# Patient Record
Sex: MALE | Race: WHITE | NOT HISPANIC OR LATINO | Employment: OTHER | URBAN - METROPOLITAN AREA
[De-identification: names, ages, dates, MRNs, and addresses within clinical notes are randomized per-mention and may not be internally consistent; named-entity substitution may affect disease eponyms.]

---

## 2017-03-13 DIAGNOSIS — N40.0 BENIGN NON-NODULAR PROSTATIC HYPERPLASIA WITHOUT LOWER URINARY TRACT SYMPTOMS: ICD-10-CM

## 2017-03-13 RX ORDER — FINASTERIDE 5 MG/1
TABLET, FILM COATED ORAL
Qty: 30 TABLET | Refills: 5 | Status: SHIPPED | OUTPATIENT
Start: 2017-03-13 | End: 2017-05-15 | Stop reason: SDUPTHER

## 2017-03-22 ENCOUNTER — TELEPHONE (OUTPATIENT)
Dept: NEUROLOGY | Facility: CLINIC | Age: 67
End: 2017-03-22

## 2017-05-15 DIAGNOSIS — N40.0 BENIGN NON-NODULAR PROSTATIC HYPERPLASIA WITHOUT LOWER URINARY TRACT SYMPTOMS: ICD-10-CM

## 2017-05-15 RX ORDER — FINASTERIDE 5 MG/1
5 TABLET, FILM COATED ORAL DAILY
Qty: 90 TABLET | Refills: 0 | Status: SHIPPED | OUTPATIENT
Start: 2017-05-15 | End: 2017-11-17 | Stop reason: SDUPTHER

## 2017-05-15 RX ORDER — FINASTERIDE 5 MG/1
TABLET, FILM COATED ORAL
Qty: 30 TABLET | Refills: 4 | OUTPATIENT
Start: 2017-05-15

## 2017-05-17 ENCOUNTER — OFFICE VISIT (OUTPATIENT)
Dept: INTERNAL MEDICINE | Age: 67
End: 2017-05-17

## 2017-05-17 VITALS
BODY MASS INDEX: 22.52 KG/M2 | WEIGHT: 148.6 LBS | HEIGHT: 68 IN | DIASTOLIC BLOOD PRESSURE: 70 MMHG | HEART RATE: 64 BPM | OXYGEN SATURATION: 99 % | TEMPERATURE: 97.7 F | SYSTOLIC BLOOD PRESSURE: 128 MMHG

## 2017-05-17 DIAGNOSIS — Z23 ENCOUNTER FOR IMMUNIZATION: ICD-10-CM

## 2017-05-17 DIAGNOSIS — Z00.00 MEDICARE ANNUAL WELLNESS VISIT, INITIAL: Primary | ICD-10-CM

## 2017-05-17 DIAGNOSIS — R73.01 IMPAIRED FASTING GLUCOSE: ICD-10-CM

## 2017-05-17 DIAGNOSIS — E78.5 HYPERLIPIDEMIA, UNSPECIFIED HYPERLIPIDEMIA TYPE: ICD-10-CM

## 2017-05-17 LAB
ALBUMIN SERPL-MCNC: 4.3 G/DL (ref 3.5–5.2)
ALBUMIN/GLOB SERPL: 1.7 G/DL
ALP SERPL-CCNC: 63 U/L (ref 39–117)
ALT SERPL-CCNC: <5 U/L (ref 1–41)
APPEARANCE UR: CLEAR
AST SERPL-CCNC: 27 U/L (ref 1–40)
BASOPHILS # BLD AUTO: 0.04 10*3/MM3 (ref 0–0.2)
BASOPHILS NFR BLD AUTO: 1.2 % (ref 0–1.5)
BILIRUB SERPL-MCNC: 0.5 MG/DL (ref 0.1–1.2)
BILIRUB UR QL STRIP: NEGATIVE
BUN SERPL-MCNC: 17 MG/DL (ref 8–23)
BUN/CREAT SERPL: 14.5 (ref 7–25)
CALCIUM SERPL-MCNC: 9.4 MG/DL (ref 8.6–10.5)
CHLORIDE SERPL-SCNC: 100 MMOL/L (ref 98–107)
CHOLEST SERPL-MCNC: 192 MG/DL (ref 0–200)
CO2 SERPL-SCNC: 26.7 MMOL/L (ref 22–29)
COLOR UR: YELLOW
CREAT SERPL-MCNC: 1.17 MG/DL (ref 0.76–1.27)
DEVELOPER EXPIRATION DATE: NORMAL
DEVELOPER LOT NUMBER: NORMAL
EOSINOPHIL # BLD AUTO: 0.06 10*3/MM3 (ref 0–0.7)
EOSINOPHIL NFR BLD AUTO: 1.8 % (ref 0.3–6.2)
ERYTHROCYTE [DISTWIDTH] IN BLOOD BY AUTOMATED COUNT: 12.2 % (ref 11.5–14.5)
EXPIRATION DATE: NORMAL
FECAL OCCULT BLOOD SCREEN, POC: NEGATIVE
GLOBULIN SER CALC-MCNC: 2.5 GM/DL
GLUCOSE SERPL-MCNC: 100 MG/DL (ref 65–99)
GLUCOSE UR QL: NEGATIVE
HCT VFR BLD AUTO: 38.4 % (ref 40.4–52.2)
HDLC SERPL-MCNC: 71 MG/DL (ref 40–60)
HGB BLD-MCNC: 13.4 G/DL (ref 13.7–17.6)
HGB UR QL STRIP: NEGATIVE
IMM GRANULOCYTES # BLD: 0 10*3/MM3 (ref 0–0.03)
IMM GRANULOCYTES NFR BLD: 0 % (ref 0–0.5)
KETONES UR QL STRIP: NEGATIVE
LDLC SERPL CALC-MCNC: 113 MG/DL (ref 0–100)
LEUKOCYTE ESTERASE UR QL STRIP: NEGATIVE
LYMPHOCYTES # BLD AUTO: 0.97 10*3/MM3 (ref 0.9–4.8)
LYMPHOCYTES NFR BLD AUTO: 29.8 % (ref 19.6–45.3)
Lab: NORMAL
MCH RBC QN AUTO: 33.4 PG (ref 27–32.7)
MCHC RBC AUTO-ENTMCNC: 34.9 G/DL (ref 32.6–36.4)
MCV RBC AUTO: 95.8 FL (ref 79.8–96.2)
MONOCYTES # BLD AUTO: 0.42 10*3/MM3 (ref 0.2–1.2)
MONOCYTES NFR BLD AUTO: 12.9 % (ref 5–12)
NEGATIVE CONTROL: NEGATIVE
NEUTROPHILS # BLD AUTO: 1.76 10*3/MM3 (ref 1.9–8.1)
NEUTROPHILS NFR BLD AUTO: 54.3 % (ref 42.7–76)
NITRITE UR QL STRIP: NEGATIVE
PH UR STRIP: 7 [PH] (ref 5–8)
PLATELET # BLD AUTO: 292 10*3/MM3 (ref 140–500)
POSITIVE CONTROL: POSITIVE
POTASSIUM SERPL-SCNC: 4.2 MMOL/L (ref 3.5–5.2)
PROT SERPL-MCNC: 6.8 G/DL (ref 6–8.5)
PROT UR QL STRIP: NEGATIVE
PSA SERPL-MCNC: 0.3 NG/ML (ref 0–4)
RBC # BLD AUTO: 4.01 10*6/MM3 (ref 4.6–6)
SODIUM SERPL-SCNC: 139 MMOL/L (ref 136–145)
SP GR UR: 1.01 (ref 1–1.03)
TRIGL SERPL-MCNC: 41 MG/DL (ref 0–150)
UROBILINOGEN UR STRIP-MCNC: NORMAL MG/DL
VLDLC SERPL CALC-MCNC: 8.2 MG/DL (ref 5–40)
WBC # BLD AUTO: 3.25 10*3/MM3 (ref 4.5–10.7)

## 2017-05-17 PROCEDURE — 90670 PCV13 VACCINE IM: CPT | Performed by: INTERNAL MEDICINE

## 2017-05-17 PROCEDURE — 90715 TDAP VACCINE 7 YRS/> IM: CPT | Performed by: INTERNAL MEDICINE

## 2017-05-17 PROCEDURE — 99213 OFFICE O/P EST LOW 20 MIN: CPT | Performed by: INTERNAL MEDICINE

## 2017-05-17 PROCEDURE — 90471 IMMUNIZATION ADMIN: CPT | Performed by: INTERNAL MEDICINE

## 2017-05-17 PROCEDURE — G0328 FECAL BLOOD SCRN IMMUNOASSAY: HCPCS | Performed by: INTERNAL MEDICINE

## 2017-05-17 PROCEDURE — G0438 PPPS, INITIAL VISIT: HCPCS | Performed by: INTERNAL MEDICINE

## 2017-05-17 PROCEDURE — G0009 ADMIN PNEUMOCOCCAL VACCINE: HCPCS | Performed by: INTERNAL MEDICINE

## 2017-05-24 ENCOUNTER — OFFICE VISIT (OUTPATIENT)
Dept: NEUROLOGY | Facility: CLINIC | Age: 67
End: 2017-05-24

## 2017-05-24 VITALS — BODY MASS INDEX: 22.67 KG/M2 | OXYGEN SATURATION: 100 % | HEIGHT: 68 IN | WEIGHT: 149.6 LBS | HEART RATE: 72 BPM

## 2017-05-24 DIAGNOSIS — G20 PARKINSON'S DISEASE (HCC): Primary | ICD-10-CM

## 2017-05-24 DIAGNOSIS — Q04.9 CORTICAL DYSPLASIA (HCC): ICD-10-CM

## 2017-05-24 DIAGNOSIS — G25.81 RESTLESS LEGS SYNDROME: ICD-10-CM

## 2017-05-24 PROCEDURE — 99213 OFFICE O/P EST LOW 20 MIN: CPT | Performed by: PSYCHIATRY & NEUROLOGY

## 2017-05-24 RX ORDER — CARBIDOPA, LEVODOPA AND ENTACAPONE 37.5; 200; 15 MG/1; MG/1; MG/1
1 TABLET, FILM COATED ORAL 3 TIMES DAILY
Qty: 270 TABLET | Refills: 3 | Status: SHIPPED | OUTPATIENT
Start: 2017-05-24 | End: 2018-05-31 | Stop reason: SDUPTHER

## 2017-05-24 RX ORDER — RASAGILINE 1 MG/1
1 TABLET ORAL DAILY
Qty: 90 TABLET | Refills: 3 | Status: SHIPPED | OUTPATIENT
Start: 2017-05-24 | End: 2017-08-21 | Stop reason: SDUPTHER

## 2017-07-11 ENCOUNTER — TELEPHONE (OUTPATIENT)
Dept: NEUROLOGY | Facility: CLINIC | Age: 67
End: 2017-07-11

## 2017-07-13 ENCOUNTER — TELEPHONE (OUTPATIENT)
Dept: NEUROLOGY | Facility: CLINIC | Age: 67
End: 2017-07-13

## 2017-07-13 NOTE — TELEPHONE ENCOUNTER
I returned a call.  There was no answer.  I tried each day this week.  Today I left a phone message as follows.    His message to me was that he was doing well after we increased the azilect and levo.  My message back to gym is that if he would like for me to reduce the Neupro patch to 2 mg to taper off of me know.  He had decreased concentration and hallucinations at various times on these medicines.

## 2017-08-07 RX ORDER — ROTIGOTINE 4 MG/24H
PATCH, EXTENDED RELEASE TRANSDERMAL
Qty: 30 PATCH | Refills: 0 | Status: SHIPPED | OUTPATIENT
Start: 2017-08-07 | End: 2017-09-17 | Stop reason: SDUPTHER

## 2017-08-11 ENCOUNTER — OFFICE VISIT (OUTPATIENT)
Dept: INTERNAL MEDICINE | Age: 67
End: 2017-08-11

## 2017-08-11 VITALS
HEART RATE: 94 BPM | TEMPERATURE: 97.6 F | HEIGHT: 68 IN | SYSTOLIC BLOOD PRESSURE: 112 MMHG | WEIGHT: 151.6 LBS | DIASTOLIC BLOOD PRESSURE: 66 MMHG | OXYGEN SATURATION: 99 % | BODY MASS INDEX: 22.97 KG/M2

## 2017-08-11 DIAGNOSIS — L24.7 PLANT IRRITANT CONTACT DERMATITIS: Primary | ICD-10-CM

## 2017-08-11 PROCEDURE — 99213 OFFICE O/P EST LOW 20 MIN: CPT | Performed by: NURSE PRACTITIONER

## 2017-08-11 RX ORDER — PREDNISONE 10 MG/1
10 TABLET ORAL DAILY
Qty: 30 TABLET | Refills: 0 | Status: SHIPPED | OUTPATIENT
Start: 2017-08-11 | End: 2017-10-04

## 2017-08-11 NOTE — PATIENT INSTRUCTIONS
Poison Ivy Dermatitis  Poison ivy dermatitis is inflammation of the skin that is caused by the allergens on the leaves of the poison ivy plant. The skin reaction often involves redness, swelling, blisters, and extreme itching.  CAUSES  This condition is caused by a specific chemical (urushiol) found in the sap of the poison ivy plant. This chemical is sticky and can be easily spread to people, animals, and objects. You can get poison ivy dermatitis by:  · Having direct contact with a poison ivy plant.  · Touching animals, other people, or objects that have come in contact with poison ivy and have the chemical on them.  RISK FACTORS  This condition is more likely to develop in:  · People who are outdoors often.  · People who go outdoors without wearing protective clothing, such as closed shoes, long pants, and a long-sleeved shirt.  SYMPTOMS  Symptoms of this condition include:  · Redness and itching.  · A rash that often includes bumps and blisters. The rash usually appears 48 hours after exposure.  · Swelling. This may occur if the reaction is more severe.  Symptoms usually last for 1-2 weeks. However, the first time you develop this condition, symptoms may last 3-4 weeks.  DIAGNOSIS  This condition may be diagnosed based on your symptoms and a physical exam. Your health care provider may also ask you about any recent outdoor activity.  TREATMENT  Treatment for this condition will vary depending on how severe it is. Treatment may include:  · Hydrocortisone creams or calamine lotions to relieve itching.  · Oatmeal baths to soothe the skin.  · Over-the-counter antihistamine tablets.  · Oral steroid medicine for more severe outbreaks.  HOME CARE INSTRUCTIONS  · Take or apply over-the-counter and prescription medicines only as told by your health care provider.  · Wash exposed skin as soon as possible with soap and cold water.  · Use hydrocortisone creams or calamine lotion as needed to soothe the skin and relieve  itching.  · Take oatmeal baths as needed. Use colloidal oatmeal. You can get this at your local pharmacy or grocery store. Follow the instructions on the packaging.  · Do not scratch or rub your skin.  · While you have the rash, wash clothes right after you wear them.  PREVENTION  · Learn to identify the poison ivy plant and avoid contact with the plant. This plant can be recognized by the number of leaves. Generally, poison ivy has three leaves with flowering branches on a single stem. The leaves are typically glossy, and they have jagged edges that come to a point at the front.  · If you have been exposed to poison ivy, thoroughly wash with soap and water right away. You have about 30 minutes to remove the plant resin before it will cause the rash. Be sure to wash under your fingernails because any plant resin there will continue to spread the rash.  · When hiking or camping, wear clothes that will help you to avoid exposure on the skin. This includes long pants, a long-sleeved shirt, tall socks, and hiking boots. You can also apply preventive lotion to your skin to help limit exposure.  · If you suspect that your clothes or outdoor gear came in contact with poison ivy, rinse them off outside with a garden hose before you bring them inside your house.  SEEK MEDICAL CARE IF:  · You have open sores in the rash area.  · You have more redness, swelling, or pain in the affected area.  · You have redness that spreads beyond the rash area.  · You have fluid, blood, or pus coming from the affected area.  · You have a fever.  · You have a rash over a large area of your body.  · You have a rash on your eyes, mouth, or genitals.  · Your rash does not improve after a few days.  SEEK IMMEDIATE MEDICAL CARE IF:  · Your face swells or your eyes swell shut.    · You have trouble breathing.    · You have trouble swallowing.       This information is not intended to replace advice given to you by your health care provider. Make  sure you discuss any questions you have with your health care provider.     Document Released: 12/15/2001 Document Revised: 04/10/2017 Document Reviewed: 05/25/2016  [employer] Interactive Patient Education ©2017 [employer]

## 2017-08-11 NOTE — PROGRESS NOTES
Subjective   Rei Silverman is a 66 y.o. male.     Rash   This is a new problem. The current episode started yesterday. The problem has been rapidly worsening since onset. The affected locations include the left arm, right arm, face, torso, back and abdomen. The rash is characterized by blistering and itchiness. He was exposed to plant contact. Pertinent negatives include no fever or joint pain. Past treatments include topical steroids. The treatment provided moderate relief. There is no history of allergies, asthma or eczema.        The following portions of the patient's history were reviewed and updated as appropriate: allergies, current medications, past family history, past medical history, past social history, past surgical history and problem list.    Review of Systems   Constitutional: Negative for chills and fever.   Musculoskeletal: Negative for joint pain.   Skin: Positive for rash.       Objective   Physical Exam   Constitutional: He is oriented to person, place, and time. Vital signs are normal. He appears well-developed and well-nourished. He is cooperative. He does not appear ill. No distress.   Neurological: He is alert and oriented to person, place, and time.   Skin: Skin is warm, dry and intact. Rash noted. Rash is vesicular.        Mild swelling to left upper eyelid.   Psychiatric: He has a normal mood and affect. His speech is normal and behavior is normal. Judgment and thought content normal. Cognition and memory are normal.   Nursing note and vitals reviewed.      Assessment/Plan   Problems Addressed this Visit     None      Visit Diagnoses     Plant irritant contact dermatitis    -  Primary    Relevant Medications    predniSONE (DELTASONE) 10 MG tablet        1. Plant irritant contact dermatitis    - predniSONE (DELTASONE) 10 MG tablet; Take 1 tablet by mouth Daily. 5 tabs PO daily x 2 days, 4 PO daily x 2, 3 PO daily x 2 days, 2 PO daily x 2 days, 1 tab PO daily x 2 days.  Dispense: 30  tablet; Refill: 0

## 2017-08-21 RX ORDER — RASAGILINE 1 MG/1
1 TABLET ORAL DAILY
Qty: 90 TABLET | Refills: 3 | Status: SHIPPED | OUTPATIENT
Start: 2017-08-21 | End: 2018-08-18 | Stop reason: SDUPTHER

## 2017-09-11 RX ORDER — TAMSULOSIN HYDROCHLORIDE 0.4 MG/1
CAPSULE ORAL
Qty: 30 CAPSULE | Refills: 1 | Status: SHIPPED | OUTPATIENT
Start: 2017-09-11 | End: 2017-11-09 | Stop reason: SDUPTHER

## 2017-09-18 RX ORDER — ROTIGOTINE 4 MG/24H
PATCH, EXTENDED RELEASE TRANSDERMAL
Qty: 30 PATCH | Refills: 0 | Status: SHIPPED | OUTPATIENT
Start: 2017-09-18 | End: 2017-10-16 | Stop reason: SDUPTHER

## 2017-10-04 ENCOUNTER — OFFICE VISIT (OUTPATIENT)
Dept: NEUROLOGY | Facility: CLINIC | Age: 67
End: 2017-10-04

## 2017-10-04 VITALS
BODY MASS INDEX: 22.64 KG/M2 | OXYGEN SATURATION: 94 % | DIASTOLIC BLOOD PRESSURE: 70 MMHG | WEIGHT: 149.4 LBS | SYSTOLIC BLOOD PRESSURE: 122 MMHG | HEART RATE: 64 BPM | HEIGHT: 68 IN

## 2017-10-04 DIAGNOSIS — Q04.9 CORTICAL DYSPLASIA (HCC): ICD-10-CM

## 2017-10-04 DIAGNOSIS — G20 PARKINSON'S DISEASE (HCC): ICD-10-CM

## 2017-10-04 DIAGNOSIS — G25.81 RESTLESS LEGS SYNDROME: Primary | ICD-10-CM

## 2017-10-04 PROCEDURE — 99214 OFFICE O/P EST MOD 30 MIN: CPT | Performed by: PSYCHIATRY & NEUROLOGY

## 2017-10-04 NOTE — PROGRESS NOTES
Subjective:     Patient ID: Rei Silverman is a 66 y.o. male.    History of Present Illness  The following portions of the patient's history were reviewed and updated as appropriate: allergies, current medications, past family history, past medical history, past social history, past surgical history and problem list.  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 He has had no falls--except one at [employer] >6mos ago, tripped on stairs.. I reviewed the 10 Parkinson's disease measures.  Does not feel anxious. Sleeping well. Some drooling, but minimal (better w gum) . Soft voice. Micrographia is improving.   Golfs and swims skiing, biking.      There is no autonomic dysfunction, sleep disturbance, falls.    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 2 scans in 2013. Last scan in October 2014. No headaches and does not want rescan.    STM-abnormal     LAST OV- resumed higher azilect 1.0. Neupro still at 4mg. Stalevo 150 tid.  THIS OV- great! No wearing off.   Review of Systems   Constitutional: Positive for fatigue. Negative for activity change and appetite change.   HENT: Negative for ear pain, facial swelling and trouble swallowing.    Eyes: Positive for visual disturbance (due to medication has some double vision). Negative for photophobia and pain.   Respiratory: Negative for choking, chest tightness and shortness of breath.    Cardiovascular: Negative for chest pain, palpitations and leg swelling.   Gastrointestinal: Negative for abdominal pain, constipation and nausea.   Endocrine: Positive for polydipsia. Negative for polyphagia and polyuria.   Genitourinary: Negative for difficulty urinating, frequency and urgency.   Musculoskeletal: Negative for back pain, gait problem and neck pain.   Skin: Negative for color change, rash and wound.   Allergic/Immunologic: Negative for  environmental allergies, food allergies and immunocompromised state.   Neurological: Negative for dizziness, tremors, seizures, syncope, facial asymmetry, speech difficulty, weakness, light-headedness, numbness and headaches.   Hematological: Negative for adenopathy. Does not bruise/bleed easily.   Psychiatric/Behavioral: Positive for confusion. Negative for agitation, behavioral problems, decreased concentration, dysphoric mood, hallucinations, self-injury, sleep disturbance and suicidal ideas. The patient is not nervous/anxious and is not hyperactive.         Objective:    Neurologic Exam     Mental Status   Oriented to person, place, and time.   Registration: recalls 3 of 3 objects. Follows 2 step commands.   Attention: normal. Concentration: normal.   Speech: speech is normal   Level of consciousness: alert  Knowledge: good.        MOCA-27   Slow on trails and clock, but got them correct.  3/5 on stm     Cranial Nerves   Cranial nerves II through XII intact.        Mild facial bradykinesis     Motor Exam   Muscle bulk: normal  Overall muscle tone: normal  Right arm tone: cogwheel rigidity  Left arm tone: normal  Right arm pronator drift: absent  Left arm pronator drift: absent  Right leg tone: normal  Left leg tone: normal       Grade 5 in all      Sensory Exam   Light touch normal.   Vibration normal.     Gait, Coordination, and Reflexes     Gait  Gait: normal    Coordination   Romberg: negative  Finger to nose coordination: normal    Tremor   Resting tremor: present  Intention tremor: absent  Action tremor: absent       Grade 1 dtrs       Physical Exam   Constitutional: He is oriented to person, place, and time. He appears well-developed and well-nourished.   Neck: Normal range of motion. Neck supple.   Cardiovascular: Normal rate.    Neurological: He is oriented to person, place, and time. He has a normal Finger-Nose-Finger Test and a normal Romberg Test. Gait normal.   Psychiatric: He has a normal mood and  affect. His speech is normal.   Nursing note and vitals reviewed.      Assessment/Plan:       Problems Addressed this Visit        Unprioritized    Cortical dysplasia    Parkinson's disease    Restless legs syndrome - Primary           He is stable. MOCA is normal (27)  No new AE. Will continue same meds..as he is much better on stalevo tid

## 2017-10-16 RX ORDER — ROTIGOTINE 4 MG/24H
PATCH, EXTENDED RELEASE TRANSDERMAL
Qty: 30 PATCH | Refills: 0 | Status: SHIPPED | OUTPATIENT
Start: 2017-10-16 | End: 2017-11-17 | Stop reason: SDUPTHER

## 2017-11-09 DIAGNOSIS — N40.0 BENIGN PROSTATIC HYPERPLASIA WITHOUT LOWER URINARY TRACT SYMPTOMS: Primary | ICD-10-CM

## 2017-11-09 RX ORDER — TAMSULOSIN HYDROCHLORIDE 0.4 MG/1
CAPSULE ORAL
Qty: 30 CAPSULE | Refills: 5 | Status: SHIPPED | OUTPATIENT
Start: 2017-11-09 | End: 2018-03-08 | Stop reason: SDUPTHER

## 2017-11-17 DIAGNOSIS — N40.0 BENIGN NON-NODULAR PROSTATIC HYPERPLASIA WITHOUT LOWER URINARY TRACT SYMPTOMS: ICD-10-CM

## 2017-11-17 RX ORDER — ROTIGOTINE 4 MG/24H
PATCH, EXTENDED RELEASE TRANSDERMAL
Qty: 30 PATCH | Refills: 0 | Status: SHIPPED | OUTPATIENT
Start: 2017-11-17 | End: 2018-11-02 | Stop reason: SDUPTHER

## 2017-11-17 RX ORDER — FINASTERIDE 5 MG/1
TABLET, FILM COATED ORAL
Qty: 30 TABLET | Refills: 4 | Status: SHIPPED | OUTPATIENT
Start: 2017-11-17 | End: 2018-05-31 | Stop reason: SDUPTHER

## 2017-12-11 DIAGNOSIS — N40.0 BENIGN NON-NODULAR PROSTATIC HYPERPLASIA WITHOUT LOWER URINARY TRACT SYMPTOMS: ICD-10-CM

## 2017-12-11 RX ORDER — FINASTERIDE 5 MG/1
TABLET, FILM COATED ORAL
Qty: 90 TABLET | Refills: 0 | Status: SHIPPED | OUTPATIENT
Start: 2017-12-11 | End: 2018-04-04 | Stop reason: SDUPTHER

## 2018-03-07 RX ORDER — FINASTERIDE 5 MG/1
TABLET, FILM COATED ORAL
Qty: 90 TABLET | Refills: 0 | Status: SHIPPED | OUTPATIENT
Start: 2018-03-07 | End: 2018-04-04 | Stop reason: SDUPTHER

## 2018-03-08 DIAGNOSIS — N40.0 BENIGN PROSTATIC HYPERPLASIA WITHOUT LOWER URINARY TRACT SYMPTOMS: ICD-10-CM

## 2018-03-08 RX ORDER — TAMSULOSIN HYDROCHLORIDE 0.4 MG/1
CAPSULE ORAL
Qty: 90 CAPSULE | Refills: 3 | Status: SHIPPED | OUTPATIENT
Start: 2018-03-08 | End: 2019-03-12 | Stop reason: SDUPTHER

## 2018-04-04 ENCOUNTER — OFFICE VISIT (OUTPATIENT)
Dept: NEUROLOGY | Facility: CLINIC | Age: 68
End: 2018-04-04

## 2018-04-04 VITALS
DIASTOLIC BLOOD PRESSURE: 58 MMHG | BODY MASS INDEX: 22.97 KG/M2 | SYSTOLIC BLOOD PRESSURE: 110 MMHG | OXYGEN SATURATION: 99 % | WEIGHT: 151.6 LBS | HEIGHT: 68 IN | HEART RATE: 62 BPM

## 2018-04-04 DIAGNOSIS — G20 PARKINSON'S DISEASE (HCC): Primary | ICD-10-CM

## 2018-04-04 DIAGNOSIS — G25.81 RESTLESS LEGS SYNDROME: ICD-10-CM

## 2018-04-04 DIAGNOSIS — Q04.9 CORTICAL DYSPLASIA (HCC): ICD-10-CM

## 2018-04-04 PROCEDURE — 99214 OFFICE O/P EST MOD 30 MIN: CPT | Performed by: PSYCHIATRY & NEUROLOGY

## 2018-04-04 NOTE — PROGRESS NOTES
Subjective:     Patient ID: Rei Silverman is a 67 y.o. male.    History of Present Illness  The following portions of the patient's history were reviewed and updated as appropriate: allergies, current medications, past family history, past medical history, past social history, past surgical history and problem list.     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 He has had no falls.  Sleeping well but wife has noted he has occasional PLM s- occasional yells out (past hx of sleep walking) but mostly moving, small twitch, and does not sound like REM sleep disorder.  Other symptoms include micrographia but his activity is excellent.  He golfed swims and bikes.  Possible referral for physical therapy was discussed in agreed to help reduce risk of falling in the future.    Abnormal short-term memory associated with parkinsonism.  This is stable.    AE OF DRUGS=VISUAL  HALLUC- unchanged and not too bothersome. Can see a' 3rd person' while walking.    ABNORMAL MRI BRAIN- has focal cortical dysplasia.  He has had 2 scans the last in 2014 and does not want to be rescanned    Parkinson's medicines: Azilect 1.0+ Neupro 4.0+ Stalevo 150 3 times a day.  Denies wearing off.  Occasional fatigue  Review of Systems   Constitutional: Positive for fatigue. Negative for activity change and appetite change.   HENT: Negative for ear pain, facial swelling and trouble swallowing.    Eyes: Negative for photophobia, pain and visual disturbance.   Respiratory: Negative for choking, chest tightness and shortness of breath.    Cardiovascular: Negative for chest pain, palpitations and leg swelling.   Gastrointestinal: Negative for abdominal pain, constipation and nausea.   Endocrine: Negative for polydipsia, polyphagia and polyuria.   Genitourinary: Negative for difficulty urinating, frequency and urgency.   Musculoskeletal: Positive  for back pain (fell on blck ice and has some soreness). Negative for gait problem and neck pain.   Skin: Negative for color change, rash and wound.   Allergic/Immunologic: Negative for environmental allergies, food allergies and immunocompromised state.   Neurological: Negative for dizziness, tremors, seizures, syncope, facial asymmetry, speech difficulty, weakness, light-headedness, numbness and headaches.   Hematological: Negative for adenopathy. Does not bruise/bleed easily.   Psychiatric/Behavioral: Positive for confusion, hallucinations and sleep disturbance (body movement and jerking when sleeping however he dos not wake up). Negative for agitation, behavioral problems, decreased concentration, dysphoric mood, self-injury and suicidal ideas. The patient is not nervous/anxious and is not hyperactive.         Objective:    Neurologic Exam     Mental Status   Oriented to person, place, and time.   Follows 3 step commands.   Attention: normal.   Speech: speech is normal   Level of consciousness: alert  Knowledge: good.   Able to name object. Able to read. Able to repeat. Able to write.   Rare hypophonia not seen today.  Occasional decreased concentration but not seen today     Cranial Nerves     CN II   Visual fields full to confrontation.     CN III, IV, VI   Pupils are equal, round, and reactive to light.  Extraocular motions are normal.     CN VII   Facial expression full, symmetric.     CN VIII   Hearing: intact    CN IX, X   CN IX normal.     CN XI   CN XI normal.     CN XII   CN XII normal.   Mild facial bradykinesia.  She is gum to keep from drooling.     Motor Exam   Muscle bulk: normal  Overall muscle tone: increased  Right arm tone: cogwheel rigidity  Left arm tone: normal  Right arm pronator drift: absent  Left arm pronator drift: absent  Right leg tone: normal  Left leg tone: normalNo weakness proximal bilateral     Sensory Exam   Light touch normal.   Vibration normal.     Gait, Coordination, and  Reflexes     Gait  Gait: normal    Coordination   Romberg: negative    Tremor   Resting tremor: absent  Intention tremor: absent  Action tremor: absent    Reflexes   Right triceps: 1+  Left triceps: 1+  Right patellar: 1+  Left patellar: 1+Not lose his balance with the push pull test       Physical Exam   Constitutional: He is oriented to person, place, and time. He appears well-developed and well-nourished.   Eyes: EOM are normal. Pupils are equal, round, and reactive to light.   Neck: Normal range of motion. Neck supple.   Cardiovascular: Normal rate.    Pulmonary/Chest: Effort normal.   Neurological: He is oriented to person, place, and time. He has a normal Romberg Test. Gait normal.   Reflex Scores:       Tricep reflexes are 1+ on the right side and 1+ on the left side.       Patellar reflexes are 1+ on the right side and 1+ on the left side.  Psychiatric: He has a normal mood and affect. His speech is normal.   Nursing note and vitals reviewed.      Assessment/Plan:       Problems Addressed this Visit        Unprioritized    Cortical dysplasia    Parkinson's disease - Primary    Relevant Orders    Ambulatory Referral to Physical Therapy (parkinsons disease)    Restless legs syndrome      Other Visit Diagnoses    None.            We spent 30 minutes discussing other aspects of the disease treatment including cost of Azilect, use of Neupro versus hallucinations, possible use of RYTARY each her.  Experimental treatments available with albuterol and Byetta.  No changes made today.  I think he is an excellent responder to Azilect.  Follow-up 6 months.  Referred to physical therapy

## 2018-04-20 DIAGNOSIS — G20 PARKINSON'S DISEASE (HCC): Primary | ICD-10-CM

## 2018-04-23 ENCOUNTER — TELEPHONE (OUTPATIENT)
Dept: NEUROLOGY | Facility: CLINIC | Age: 68
End: 2018-04-23

## 2018-04-23 DIAGNOSIS — G20 PARKINSON'S DISEASE (HCC): Primary | ICD-10-CM

## 2018-05-01 ENCOUNTER — TELEPHONE (OUTPATIENT)
Dept: INTERNAL MEDICINE | Age: 68
End: 2018-05-01

## 2018-05-01 NOTE — TELEPHONE ENCOUNTER
Pt stated he just noticed last few days a spot on side of tongue, and each day it has grown a little more. He stated it looks like the size of small curd of cottage cheese, it's white and hard. Pt denied any pain.   It's obvious to him that it is there especially when he talks.  Pt wasn't sure if Dr Monae would want to see him or send him to see a specialist?  Pt's # [phone]  Thanks SP

## 2018-05-01 NOTE — TELEPHONE ENCOUNTER
I would start with the dentist, and if that is not helpful, we can send him to see an ENT physician.

## 2018-05-31 DIAGNOSIS — N40.0 BENIGN NON-NODULAR PROSTATIC HYPERPLASIA WITHOUT LOWER URINARY TRACT SYMPTOMS: ICD-10-CM

## 2018-05-31 RX ORDER — CARBIDOPA, LEVODOPA AND ENTACAPONE 37.5; 200; 15 MG/1; MG/1; MG/1
TABLET, FILM COATED ORAL
Qty: 270 TABLET | Refills: 2 | Status: SHIPPED | OUTPATIENT
Start: 2018-05-31 | End: 2018-11-02 | Stop reason: SDUPTHER

## 2018-06-01 RX ORDER — FINASTERIDE 5 MG/1
TABLET, FILM COATED ORAL
Qty: 90 TABLET | Refills: 0 | Status: SHIPPED | OUTPATIENT
Start: 2018-06-01 | End: 2018-08-18 | Stop reason: SDUPTHER

## 2018-06-27 ENCOUNTER — TREATMENT (OUTPATIENT)
Dept: NEUROLOGY | Facility: CLINIC | Age: 68
End: 2018-06-27

## 2018-06-27 NOTE — PROGRESS NOTES
My review of speech-language pathology note from [facility] from June 20, 2018 1 the cognitive linguistic Quick test demonstrated borderline, moderate to severe, impairment in memory, executive function, and visual spatial skills.  Patient's language was only mildly impaired.  He had slow processing of information.  He had disorganized scanning and visual information.  There was poor self-monitoring during cognitive tasks.  Significantly reduced working memory was noted.  Overall executive function deficits are severely impairing patient's safety and problem solving.  He also reports difficulty with speech and voice.  However the cognitive communication deficit is a priority and we'll be treated first.

## 2018-08-07 ENCOUNTER — TELEPHONE (OUTPATIENT)
Dept: INTERNAL MEDICINE | Age: 68
End: 2018-08-07

## 2018-08-07 NOTE — TELEPHONE ENCOUNTER
Pt called stating he was recently discharged from hospital with new medication, Lipitor 80 mg once daily.  Pt requesting a 30 day supply sent to his [facility] pharmacy.  Pt's # [phone]  Thanks SP

## 2018-08-07 NOTE — TELEPHONE ENCOUNTER
Please advise? I see nothing in patient's chart in regards to pt being seen at Hospital or being admitted to [facility] or [facility]. Pt has no Hospital F/U scheduled with Dr. Monae also. Please advise?

## 2018-08-07 NOTE — TELEPHONE ENCOUNTER
We need to find out where he went to ER, obtain records, give them to doc for review, then ask him if he wants to refill. MM

## 2018-08-08 DIAGNOSIS — G20 PARKINSON'S DISEASE (HCC): Primary | ICD-10-CM

## 2018-08-18 DIAGNOSIS — N40.0 BENIGN NON-NODULAR PROSTATIC HYPERPLASIA WITHOUT LOWER URINARY TRACT SYMPTOMS: ICD-10-CM

## 2018-08-20 RX ORDER — FINASTERIDE 5 MG/1
TABLET, FILM COATED ORAL
Qty: 90 TABLET | Refills: 0 | Status: SHIPPED | OUTPATIENT
Start: 2018-08-20 | End: 2018-12-01 | Stop reason: SDUPTHER

## 2018-08-20 RX ORDER — RASAGILINE 1 MG/1
TABLET ORAL
Qty: 90 TABLET | Refills: 2 | Status: SHIPPED | OUTPATIENT
Start: 2018-08-20 | End: 2018-11-02 | Stop reason: SDUPTHER

## 2018-09-05 ENCOUNTER — TELEPHONE (OUTPATIENT)
Dept: INTERNAL MEDICINE | Age: 68
End: 2018-09-05

## 2018-09-05 NOTE — TELEPHONE ENCOUNTER
Send him in Lipitor, 80 mg-number 30-3 refills.  Any further refills will need to come from Dr. Monae.

## 2018-09-05 NOTE — TELEPHONE ENCOUNTER
Patient was started on lipitor 80mg when he was in the hospital about a month ago. He has now run out and he wants to know if you want him to continue on them and if you do he needs the refill called into [facility] on [address].  Any questions please call [phone]

## 2018-09-06 RX ORDER — ATORVASTATIN CALCIUM 80 MG/1
80 TABLET, FILM COATED ORAL DAILY
Qty: 30 TABLET | Refills: 3 | Status: SHIPPED | OUTPATIENT
Start: 2018-09-06 | End: 2018-12-01 | Stop reason: SDUPTHER

## 2018-09-13 PROBLEM — Z86.010 HISTORY OF COLON POLYPS: Status: ACTIVE | Noted: 2018-06-27

## 2018-10-10 ENCOUNTER — OFFICE VISIT (OUTPATIENT)
Dept: NEUROLOGY | Facility: CLINIC | Age: 68
End: 2018-10-10

## 2018-10-10 VITALS
HEIGHT: 68 IN | BODY MASS INDEX: 22.25 KG/M2 | HEART RATE: 62 BPM | SYSTOLIC BLOOD PRESSURE: 98 MMHG | WEIGHT: 146.8 LBS | DIASTOLIC BLOOD PRESSURE: 62 MMHG | OXYGEN SATURATION: 99 %

## 2018-10-10 DIAGNOSIS — Q04.9 CORTICAL DYSPLASIA (HCC): Primary | ICD-10-CM

## 2018-10-10 DIAGNOSIS — G20 PARKINSON'S DISEASE (HCC): ICD-10-CM

## 2018-10-10 DIAGNOSIS — G45.9 TIA (TRANSIENT ISCHEMIC ATTACK): ICD-10-CM

## 2018-10-10 PROCEDURE — 99215 OFFICE O/P EST HI 40 MIN: CPT | Performed by: PSYCHIATRY & NEUROLOGY

## 2018-10-10 NOTE — PROGRESS NOTES
"Subjective:     Patient ID: Rei Silverman is a 67 y.o. male.    History of Present Illness  The following portions of the patient's history were reviewed and updated as appropriate: allergies, current medications, past family history, past medical history, past social history, past surgical history and problem list.     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 He has had no falls.  Sleeping well but wife has noted he has occasional PLM s- occasional yells out (past hx of sleep walking)     Other symptoms include micrographia but his activity is excellent.  He golfed swims and bikes.  He was referred to physical therapy to help reduce risk of falling in the future.            Azilect 1.0+ Neupro 4.0+ Stalevo 150 3 times a day.      STM-chronic abnormality from PD.           My review of speech-language pathology note from [facility] from June 20, 2018 1 the cognitive linguistic Quick test demonstrated borderline, moderate to severe, impairment in memory, executive function, and visual spatial skills.  Patient's language was only mildly impaired.  He had slow processing of information.  He had disorganized scanning and visual information.  There was poor self-monitoring during cognitive tasks.  Significantly reduced working memory was noted.  Overall executive function deficits are severely impairing patient's safety and problem solving.  He also reports difficulty with speech and voice.  However the cognitive communication deficit  treated first through speech therapy.  Heat aggravates fatigue.  He will be reeval by ST in 6 months.     TIA- he reported an abnormality of speech, with slurring, MRI history stated \"two transient episode of difficulty speaking and word finding difficulty\"         HISTORY- 20 min spell, spoke odd sounds, not words, then resolved. Repeated a second time. 20 min. No headache. No " HP. Was admitted for 2 days  at [place] [address] .  CTA of H/N ok. Stroke team said possible PD symptoms. Started Lipitor 80mg/d.  Echo was normal with EF of 55%, but no KONRAD, no bubble study, no L-T monitor or LINQ recorder, etc.       AE OF DRUGS=     VISUAL  HALLUC- unchanged and not too bothersome. Can see a' 3rd person' while walking. Occasional fatigue, probably from Nupro.      ABNORMAL MRI BRAIN- has focal cortical dysplasia.  He has had 2 scans the last in 2014.      Review of Systems   Constitutional: Positive for fatigue. Negative for activity change and appetite change.   HENT: Positive for trouble swallowing. Negative for ear pain and facial swelling.    Eyes: Positive for discharge (right eye crusty on occ.). Negative for photophobia, pain and visual disturbance.   Respiratory: Negative for choking, chest tightness and shortness of breath.    Cardiovascular: Negative for chest pain, palpitations and leg swelling.   Gastrointestinal: Negative for abdominal pain, constipation and nausea.   Endocrine: Negative for polydipsia, polyphagia and polyuria.   Genitourinary: Negative for difficulty urinating, frequency and urgency.   Musculoskeletal: Negative for back pain, gait problem and neck pain.   Skin: Negative for color change, rash and wound.   Allergic/Immunologic: Negative for environmental allergies, food allergies and immunocompromised state.   Neurological: Positive for speech difficulty. Negative for dizziness, tremors, seizures, syncope, facial asymmetry, weakness, light-headedness, numbness and headaches.   Hematological: Negative for adenopathy. Does not bruise/bleed easily.   Psychiatric/Behavioral: Positive for confusion, decreased concentration and hallucinations. Negative for agitation, behavioral problems, dysphoric mood, self-injury, sleep disturbance and suicidal ideas. The patient is not nervous/anxious and is not hyperactive.         Objective:    Neurologic Exam  Mental Status   Oriented to  person, place, and time.   Follows 3 step commands.   Attention: normal.   Speech: speech is normal   Level of consciousness: alert  Knowledge: good.   Able to name object. Able to read. Able to repeat. Able to write.    Occasional decreased concentration but not seen today      Cranial Nerves      CN II   Visual fields full to confrontation.      CN III, IV, VI   Pupils are equal, round, and reactive to light.  Extraocular motions are normal.      CN VII   Facial expression full, symmetric.      CN VIII   Hearing: intact     CN IX, X   CN IX normal.      CN XI   CN XI normal.      CN XII   CN XII normal.   Mild facial bradykinesia.       Motor Exam   Muscle bulk: normal  Overall muscle tone: increased  Right arm tone: cogwheel rigidity is mild  Left arm tone: normal  Right arm pronator drift: absent  Left arm pronator drift: absent  Right leg tone: normal  Left leg tone: normal    No weakness proximal bilateral      Sensory Exam   Light touch normal.   Vibration normal.      Gait: normal w some dyskinesia of arms, mile      Coordination   Romberg: negative     Tremor   Resting tremor: absent  Intention tremor: absent  Action tremor: absent     Reflexes   Right triceps: 1+  Left triceps: 1+  Right patellar: 1+  Left patellar: 1+    No LOB     Physical Exam   Constitutional: He appears well-developed and well-nourished.   Neck: Normal range of motion. Neck supple.   Cardiovascular: Normal rate.    Pulmonary/Chest: Effort normal.   Psychiatric: He has a normal mood and affect. His behavior is normal.   Nursing note and vitals reviewed.      Assessment/Plan:       Problems Addressed this Visit        Unprioritized    Cortical dysplasia (CMS/HCC) - Primary    Parkinson's disease (CMS/HCC)    TIA (transient ischemic attack)             Parkinsonism is stable.  Mild hallucinations not a problem and no change in medication warranted at this time.    The TIA history shows no stroke or vascular disease but he has not had  further cardiac evaluation to rule out atrial fibrillation or disease of the posterior heart wall or mitral valve and thus she will need cardiology referral.  He will contact Dr. Monae for the referral.    The brain MRI at [place] [address] showed no evidence of change in the cortical dysplasia found on prior brain MRI.      Follow-up scheduled here. I spent 40 minutes face to face time with this patient with greater than 50% of the time for counseling care with long discussion concerning TIA.CHAY VASC used as well as the need for cardiology evaluation.  Extra 25 minutes spent reviewing all the studies from [facility]

## 2018-10-10 NOTE — PROGRESS NOTES
Subjective:     Patient ID: Rei Silverman is a 67 y.o. male.    History of Present Illness  {Common ambulatory SmartLinks:57139}    Review of Systems     Objective:    Neurologic Exam    Physical Exam    Assessment/Plan:     {Assess/PlanSmartLinks:56821}

## 2018-10-12 ENCOUNTER — TELEPHONE (OUTPATIENT)
Dept: INTERNAL MEDICINE | Age: 68
End: 2018-10-12

## 2018-10-12 DIAGNOSIS — G45.9 TIA (TRANSIENT ISCHEMIC ATTACK): Primary | ICD-10-CM

## 2018-10-12 NOTE — TELEPHONE ENCOUNTER
Pt was seen by neurology who suggested a cardiology consultation because of his TIA.  Please arrange this with Dr. Claudio

## 2018-10-12 NOTE — TELEPHONE ENCOUNTER
P/Shanique(wife) pt [facility] & since then a lot has occurred. However, Dr. Edu Farrar/neurologist has discussed with the pt & wife to make contact with  in re: to setting up a cardiology referral ASAP due to a test to examine the back of the heart & heart chip monitor.    Pls advise.    Shanique can be reached #[phone].

## 2018-10-22 ENCOUNTER — OFFICE VISIT (OUTPATIENT)
Dept: CARDIOLOGY | Facility: CLINIC | Age: 68
End: 2018-10-22

## 2018-10-22 VITALS
DIASTOLIC BLOOD PRESSURE: 78 MMHG | WEIGHT: 152.5 LBS | HEART RATE: 70 BPM | HEIGHT: 68 IN | SYSTOLIC BLOOD PRESSURE: 108 MMHG | BODY MASS INDEX: 23.11 KG/M2

## 2018-10-22 DIAGNOSIS — I48.0 PAROXYSMAL ATRIAL FIBRILLATION (HCC): ICD-10-CM

## 2018-10-22 DIAGNOSIS — G45.9 TIA (TRANSIENT ISCHEMIC ATTACK): Primary | ICD-10-CM

## 2018-10-22 DIAGNOSIS — E78.2 MIXED HYPERLIPIDEMIA: ICD-10-CM

## 2018-10-22 PROCEDURE — 93000 ELECTROCARDIOGRAM COMPLETE: CPT | Performed by: INTERNAL MEDICINE

## 2018-10-22 PROCEDURE — 99204 OFFICE O/P NEW MOD 45 MIN: CPT | Performed by: INTERNAL MEDICINE

## 2018-10-22 NOTE — PROGRESS NOTES
Date of Office Visit: 10/22/18  Encounter Provider: Emil Claudio MD  Place of Service: [facility] CARDIOLOGY  Patient Name: Rei Silverman  :1950  Referral Provider:Miles Monae MD      No chief complaint on file.    History of Present Illness  Mr Silverman is a very pleasant 66 yo gentleman with no prior history of heart disease but does have history of probable TIA, Parkinson's and hyperlipidemia.  His Parkinson's is been fairly stable but in 2018 he presented to [facility] emergency room after he had problems with very slurred garbled speech lasted about 15-20 minutes resolved and recurred again he was admitted to [facility] where he was seen by the stroke team his workup there was relatively negative in that he had a normal echocardiogram.  CTs and MRIs were negative and they felt that it was not definitely a TIA he did have a 2-D echocardiogram with Doppler that was normal.  He then saw his neurologist was concerned that he had a TIA.  Of note he had no episodes prior to that he's had no episodes since then..  No associated paralysis or paresthesias with it he was on aspirin 81 mg at the time.  He denies any prior history of palpitations, near-syncope or syncope.  No chest pain or pressure no orthopnea or PND.  In addition he exercises regularly he power walks close to 4 miles a day.          Past Medical History:   Diagnosis Date   • Essential tremor    • Hyperlipidemia    • Parkinson's disease (CMS/[facility])    • Septic joint of left elbow (CMS/[facility])              Past Surgical History:   Procedure Laterality Date   • COLONOSCOPY     • ELBOW DEBRIDEMENT     • NASAL SINUS SURGERY     • RETINAL DETACHMENT SURGERY           Current Outpatient Prescriptions on File Prior to Visit   Medication Sig Dispense Refill   • Ascorbic Acid (VITAMIN C) 500 MG capsule Take 1 capsule by mouth daily.     • aspirin 81 MG tablet Take 1 tablet by mouth daily.     • atorvastatin  (LIPITOR) 80 MG tablet Take 1 tablet by mouth Daily. 30 tablet 3   • B Complex-Folic Acid (SUPER B COMPLEX MAXI) tablet Take 1 tablet by mouth daily.     • carbidopa-levodopa-entacapone (STALEVO) 37.5-150-200 MG per tablet TAKE ONE TABLET BY MOUTH THREE TIMES A  tablet 2   • Cholecalciferol (VITAMIN D-3) 1000 UNITS capsule Take 1 capsule by mouth daily.     • finasteride (PROSCAR) 5 MG tablet TAKE ONE TABLET BY MOUTH DAILY 90 tablet 0   • Magnesium 400 MG capsule Take 1 capsule by mouth daily.     • melatonin tablet Take 6 mg by mouth Daily.     • Multiple Vitamins-Minerals (CENTRUM SILVER) tablet Take 1 tablet by mouth daily.     • NEUPRO 4 MG/24HR patch APPLY/CHANGE ONE PATCH TO THE SKIN DAILY 30 patch 0   • rasagiline (AZILECT) 1 MG tablet TAKE ONE TABLET BY MOUTH DAILY 90 tablet 2   • tamsulosin (FLOMAX) 0.4 MG capsule 24 hr capsule TAKE ONE CAPSULE BY MOUTH DAILY 90 capsule 3   • Unable to find 1 each 1 (One) Time. Med Name: Protandum     • vitamin E 400 UNIT capsule Take 1 capsule by mouth daily.       No current facility-administered medications on file prior to visit.          Social History     Social History   • Marital status:      Spouse name: N/A   • Number of children: 2   • Years of education: N/A     Occupational History   • Retired      Social History Main Topics   • Smoking status: Never Smoker   • Smokeless tobacco: Never Used      Comment: CAFFEINE USE   • Alcohol use No      Comment: Former use   • Drug use: No      Comment: Former use   • Sexual activity: Defer     Other Topics Concern   • Not on file     Social History Narrative   • No narrative on file       Family History   Problem Relation Age of Onset   • Breast cancer Mother    • Hypertension Mother    • Coronary artery disease Father         MI   • Depression Father    • Diabetes Father    • Hypertension Father    • Depression Sister          Review of Systems   Constitution: Negative for decreased appetite, diaphoresis,  "fever, weakness, malaise/fatigue, weight gain and weight loss.   HENT: Negative for congestion, hearing loss, nosebleeds and tinnitus.    Eyes: Positive for vision loss in left eye and vision loss in right eye. Negative for blurred vision, double vision and visual disturbance.   Cardiovascular:        As noted in HPI   Respiratory:        As noted HPI   Endocrine: Negative for cold intolerance and heat intolerance.   Hematologic/Lymphatic: Negative for bleeding problem. Does not bruise/bleed easily.   Skin: Negative for color change, flushing, itching and rash.   Musculoskeletal: Negative for arthritis, back pain, joint pain, joint swelling, muscle weakness and myalgias.   Gastrointestinal: Negative for bloating, abdominal pain, constipation, diarrhea, dysphagia, heartburn, hematemesis, hematochezia, melena, nausea and vomiting.   Genitourinary: Negative for bladder incontinence, dysuria, frequency, nocturia and urgency.   Neurological: Negative for dizziness, focal weakness, headaches, light-headedness, loss of balance, numbness, paresthesias and vertigo.   Psychiatric/Behavioral: Negative for depression, memory loss and substance abuse.       Procedures      ECG 12 Lead  Date/Time: 10/22/2018 3:42 PM  Performed by: AKIRA CORONA  Authorized by: AKIRA CORONA   Comparison: compared with previous ECG   Similar to previous ECG  Rhythm: sinus rhythm  Rate: normal  QRS axis: normal                  Objective:    /78 (BP Location: Right arm)   Pulse 70   Ht 172.7 cm (68\")   Wt 69.2 kg (152 lb 8 oz)   BMI 23.19 kg/m²        Physical Exam  Physical Exam   Constitutional: He is oriented to person, place, and time. He appears well-developed and well-nourished. No distress.   HENT:   Head: Normocephalic.   Eyes: Pupils are equal, round, and reactive to light. Conjunctivae are normal. No scleral icterus.   Neck: Normal carotid pulses, no hepatojugular reflux and no JVD present. Carotid bruit is not present. " No tracheal deviation, no edema and no erythema present. No thyromegaly present.   Cardiovascular: Normal rate, regular rhythm, S1 normal, S2 normal, normal heart sounds and intact distal pulses.   No extrasystoles are present. PMI is not displaced.  Exam reveals no gallop, no distant heart sounds and no friction rub.    No murmur heard.  Pulses:       Carotid pulses are 2+ on the right side, and 2+ on the left side.       Radial pulses are 2+ on the right side, and 2+ on the left side.        Femoral pulses are 2+ on the right side, and 2+ on the left side.       Dorsalis pedis pulses are 2+ on the right side, and 2+ on the left side.        Posterior tibial pulses are 2+ on the right side, and 2+ on the left side.   Pulmonary/Chest: Effort normal and breath sounds normal. No respiratory distress. He has no decreased breath sounds. He has no wheezes. He has no rhonchi. He has no rales. He exhibits no tenderness.   Abdominal: Soft. Bowel sounds are normal. He exhibits no distension and no mass. There is no hepatosplenomegaly. There is no tenderness. There is no rebound and no guarding.   Musculoskeletal: He exhibits no edema, tenderness or deformity.   Neurological: He is alert and oriented to person, place, and time.   Skin: Skin is warm and dry. No rash noted. He is not diaphoretic. No cyanosis or erythema. No pallor. Nails show no clubbing.   Psychiatric: He has a normal mood and affect. His speech is normal and behavior is normal. Judgment and thought content normal.           Assessment:   1.  67-year-old gentleman with what I believe sounds like a TIA although his imaging was unremarkable doesn't sound compatible with his Parkinson's in if a TIA unexplained.  Echocardiogram was normal I do believe we need to rule out atrial fibrillation therefore he'll wear a prolonged monitoring device if that's unremarkable and encouraged him to consider loop recorder.  Again will make recommendations pending that monitor  device.  Until than would maintain him on aspirin and target dose atorvastatin.  2.  History of Parkinson's following with neurology.   3.  Hyperlipidemia now on target dose atorvastatin.  4.  History of colon polyps.         Plan:

## 2018-10-24 ENCOUNTER — TELEPHONE (OUTPATIENT)
Dept: NEUROLOGY | Facility: CLINIC | Age: 68
End: 2018-10-24

## 2018-10-24 NOTE — TELEPHONE ENCOUNTER
I called Mr. Mrs. Silverman and explained the possible benefit of Apokyn, and he agreed to talk with the home health care nurse concerning dosing etc.

## 2018-11-02 ENCOUNTER — TELEPHONE (OUTPATIENT)
Dept: NEUROLOGY | Facility: CLINIC | Age: 68
End: 2018-11-02

## 2018-11-02 RX ORDER — RASAGILINE 1 MG/1
1 TABLET ORAL DAILY
Qty: 90 TABLET | Refills: 2 | Status: SHIPPED | OUTPATIENT
Start: 2018-11-02 | End: 2018-11-05 | Stop reason: SDUPTHER

## 2018-11-02 RX ORDER — CARBIDOPA, LEVODOPA AND ENTACAPONE 37.5; 200; 15 MG/1; MG/1; MG/1
1 TABLET, FILM COATED ORAL 3 TIMES DAILY
Qty: 270 TABLET | Refills: 2 | Status: SHIPPED | OUTPATIENT
Start: 2018-11-02 | End: 2018-11-05 | Stop reason: SDUPTHER

## 2018-11-02 NOTE — TELEPHONE ENCOUNTER
----- Message from Nirmala Perez sent at 11/2/2018 11:38 AM EDT -----  Contact: [phone]  Patient hasn't heard anything from Home Health Nurse concerning dosing of Apokyn. He also needed a refill on Azilect, Carbidopa-levodopa-entacapone and also his Neupro patch.

## 2018-11-05 RX ORDER — RASAGILINE 1 MG/1
1 TABLET ORAL DAILY
Qty: 90 TABLET | Refills: 2 | Status: SHIPPED | OUTPATIENT
Start: 2018-11-05 | End: 2019-10-01 | Stop reason: SDUPTHER

## 2018-11-05 RX ORDER — CARBIDOPA, LEVODOPA AND ENTACAPONE 37.5; 200; 15 MG/1; MG/1; MG/1
1 TABLET, FILM COATED ORAL 3 TIMES DAILY
Qty: 270 TABLET | Refills: 2 | Status: SHIPPED | OUTPATIENT
Start: 2018-11-05 | End: 2019-09-26 | Stop reason: SDUPTHER

## 2018-11-05 NOTE — TELEPHONE ENCOUNTER
I will refill all 3    I'm not sure why the apokyn nurse educator has not called.    The gentleman's name is Bret Garcia and his direct phone number is [phone]

## 2018-11-29 ENCOUNTER — TELEPHONE (OUTPATIENT)
Dept: CARDIOLOGY | Facility: CLINIC | Age: 68
End: 2018-11-29

## 2018-11-29 DIAGNOSIS — I63.10 CEREBROVASCULAR ACCIDENT (CVA) DUE TO EMBOLISM OF PRECEREBRAL ARTERY (HCC): Primary | ICD-10-CM

## 2018-11-29 DIAGNOSIS — I48.0 PAROXYSMAL ATRIAL FIBRILLATION (HCC): ICD-10-CM

## 2018-12-01 DIAGNOSIS — N40.0 BENIGN NON-NODULAR PROSTATIC HYPERPLASIA WITHOUT LOWER URINARY TRACT SYMPTOMS: ICD-10-CM

## 2018-12-03 PROBLEM — I63.10 CEREBROVASCULAR ACCIDENT (CVA) DUE TO EMBOLISM OF PRECEREBRAL ARTERY (HCC): Status: ACTIVE | Noted: 2018-12-03

## 2018-12-03 PROBLEM — I48.0 PAROXYSMAL ATRIAL FIBRILLATION: Status: ACTIVE | Noted: 2018-12-03

## 2018-12-03 RX ORDER — ATORVASTATIN CALCIUM 80 MG/1
TABLET, FILM COATED ORAL
Qty: 30 TABLET | Refills: 2 | Status: SHIPPED | OUTPATIENT
Start: 2018-12-03 | End: 2018-12-20 | Stop reason: ALTCHOICE

## 2018-12-03 RX ORDER — FINASTERIDE 5 MG/1
TABLET, FILM COATED ORAL
Qty: 90 TABLET | Refills: 0 | Status: SHIPPED | OUTPATIENT
Start: 2018-12-03 | End: 2019-03-12 | Stop reason: SDUPTHER

## 2018-12-04 ENCOUNTER — HOSPITAL ENCOUNTER (OUTPATIENT)
Facility: HOSPITAL | Age: 68
Setting detail: HOSPITAL OUTPATIENT SURGERY
Discharge: HOME | End: 2018-12-04
Attending: INTERNAL MEDICINE | Admitting: INTERNAL MEDICINE

## 2018-12-04 VITALS
RESPIRATION RATE: 16 BRPM | BODY MASS INDEX: 22.22 KG/M2 | WEIGHT: 150 LBS | DIASTOLIC BLOOD PRESSURE: 68 MMHG | HEART RATE: 73 BPM | HEIGHT: 69 IN | TEMPERATURE: 98 F | OXYGEN SATURATION: 99 % | SYSTOLIC BLOOD PRESSURE: 118 MMHG

## 2018-12-04 DIAGNOSIS — I63.10 CEREBROVASCULAR ACCIDENT (CVA) DUE TO EMBOLISM OF PRECEREBRAL ARTERY (HCC): ICD-10-CM

## 2018-12-04 DIAGNOSIS — I48.0 PAROXYSMAL ATRIAL FIBRILLATION (HCC): ICD-10-CM

## 2018-12-04 PROCEDURE — 33282 PR IMPLANTATION PT-ACTIVATED CARDIAC EVENT RECORDER: CPT | Performed by: INTERNAL MEDICINE

## 2018-12-04 PROCEDURE — S0260 H&P FOR SURGERY: HCPCS | Performed by: INTERNAL MEDICINE

## 2018-12-04 PROCEDURE — C1764 EVENT RECORDER, CARDIAC: HCPCS | Performed by: INTERNAL MEDICINE

## 2018-12-04 PROCEDURE — 33282 HC INSERTION PT ACTIV CARD EVNT REC: CPT | Performed by: INTERNAL MEDICINE

## 2018-12-04 DEVICE — ICM LP/RECRD REVEAL LINQ MEDTRONIC: Type: IMPLANTABLE DEVICE | Status: FUNCTIONAL

## 2018-12-04 RX ORDER — SODIUM CHLORIDE 9 MG/ML
INJECTION, SOLUTION INTRAVENOUS CONTINUOUS PRN
Status: COMPLETED | OUTPATIENT
Start: 2018-12-04 | End: 2018-12-04

## 2018-12-04 RX ORDER — SODIUM CHLORIDE 9 MG/ML
75 INJECTION, SOLUTION INTRAVENOUS CONTINUOUS
Status: DISCONTINUED | OUTPATIENT
Start: 2018-12-04 | End: 2018-12-04 | Stop reason: HOSPADM

## 2018-12-04 RX ORDER — LIDOCAINE HYDROCHLORIDE AND EPINEPHRINE 10; 10 MG/ML; UG/ML
INJECTION, SOLUTION INFILTRATION; PERINEURAL AS NEEDED
Status: DISCONTINUED | OUTPATIENT
Start: 2018-12-04 | End: 2018-12-04 | Stop reason: HOSPADM

## 2018-12-04 RX ORDER — SODIUM CHLORIDE 0.9 % (FLUSH) 0.9 %
3-10 SYRINGE (ML) INJECTION AS NEEDED
Status: DISCONTINUED | OUTPATIENT
Start: 2018-12-04 | End: 2018-12-04 | Stop reason: HOSPADM

## 2018-12-04 RX ORDER — SODIUM CHLORIDE 0.9 % (FLUSH) 0.9 %
3 SYRINGE (ML) INJECTION EVERY 12 HOURS SCHEDULED
Status: DISCONTINUED | OUTPATIENT
Start: 2018-12-04 | End: 2018-12-04 | Stop reason: HOSPADM

## 2018-12-04 RX ORDER — LIDOCAINE HYDROCHLORIDE 10 MG/ML
0.1 INJECTION, SOLUTION EPIDURAL; INFILTRATION; INTRACAUDAL; PERINEURAL ONCE AS NEEDED
Status: DISCONTINUED | OUTPATIENT
Start: 2018-12-04 | End: 2018-12-04 | Stop reason: HOSPADM

## 2018-12-04 NOTE — H&P
Referring Provider:       Patient Care Team:  Miles Monae MD as PCP - General (Internal Medicine)  Edu Farrar MD as PCP - Claims Attributed  Edu Farrar MD as Consulting Physician (Neurology)    Chief complaint: TIA    History of present illness:    68-year-old male with a medical history of Parkinson's, hyperlipidemia, and TIA who presents to me today for implantable recording device to rule out atrial fibrillation.  He has no new complaints currently.    Review of Systems  All other systems reviewed and negative.     Past Medical History:   Past Medical History:   Diagnosis Date   • Essential tremor    • Hyperlipidemia    • Parkinson's disease (CMS/[facility])    • Septic joint of left elbow (CMS/[facility])     2011       Past Surgical History:   Past Surgical History:   Procedure Laterality Date   • COLONOSCOPY     • ELBOW DEBRIDEMENT     • NASAL SINUS SURGERY     • RETINAL DETACHMENT SURGERY         Family History:   Family History   Problem Relation Age of Onset   • Breast cancer Mother    • Hypertension Mother    • Coronary artery disease Father         MI   • Depression Father    • Diabetes Father    • Hypertension Father    • Depression Sister        Social History:   Social History     Tobacco Use   • Smoking status: Never Smoker   • Smokeless tobacco: Never Used   • Tobacco comment: CAFFEINE USE   Substance Use Topics   • Alcohol use: No     Comment: Former use   • Drug use: No     Comment: Former use       Home Medications:   Medications Prior to Admission   Medication Sig Dispense Refill Last Dose   • Ascorbic Acid (VITAMIN C) 500 MG capsule Take 1 capsule by mouth daily.   12/3/2018 at Unknown time   • aspirin 81 MG tablet Take 81 mg by mouth Daily.   12/3/2018 at Unknown time   • atorvastatin (LIPITOR) 80 MG tablet TAKE ONE TABLET BY MOUTH DAILY 30 tablet 2 12/3/2018 at Unknown time   • carbidopa-levodopa-entacapone (STALEVO) 37.5-150-200 MG per tablet Take 1 tablet by mouth 3 (Three) Times a Day. 270  tablet 2 12/4/2018 at Unknown time   • Cholecalciferol (VITAMIN D-3) 1000 UNITS capsule Take 1 capsule by mouth daily.   12/3/2018 at Unknown time   • finasteride (PROSCAR) 5 MG tablet TAKE ONE TABLET BY MOUTH DAILY 90 tablet 0 12/3/2018 at Unknown time   • Magnesium 400 MG capsule Take 1 capsule by mouth daily.   12/3/2018 at Unknown time   • melatonin tablet Take 6 mg by mouth Daily.   12/3/2018 at Unknown time   • Multiple Vitamins-Minerals (CENTRUM SILVER) tablet Take 1 tablet by mouth daily.   12/4/2018 at Unknown time   • rasagiline (AZILECT) 1 MG tablet Take 1 tablet by mouth Daily. 90 tablet 2 12/4/2018 at Unknown time   • rotigotine (NEUPRO) 4 MG/24HR patch Place 1 patch on the skin as directed by provider Daily. 90 patch 3 12/3/2018 at Unknown time   • tamsulosin (FLOMAX) 0.4 MG capsule 24 hr capsule TAKE ONE CAPSULE BY MOUTH DAILY 90 capsule 3 12/3/2018 at Unknown time   • trimethobenzamide (TIGAN) 300 MG capsule Take 1 capsule by mouth 3 (Three) Times a Day. 90 capsule 4 12/3/2018 at Unknown time   • Unable to find 1 each 1 (One) Time. Med Name: Protandum   12/3/2018 at Unknown time   • vitamin E 400 UNIT capsule Take 1 capsule by mouth daily.   12/4/2018 at Unknown time       Current Medications:   Current Facility-Administered Medications:   •  lidocaine PF 1% (XYLOCAINE) injection 0.1 mL, 0.1 mL, Intradermal, Once PRN, Mayito Rodriguez MD  •  sodium chloride 0.9 % flush 3 mL, 3 mL, Intravenous, Q12H, Mayito Rodriguez MD  •  sodium chloride 0.9 % flush 3-10 mL, 3-10 mL, Intravenous, PRN, Mayito Rodriguez MD  •  sodium chloride 0.9 % infusion, 75 mL/hr, Intravenous, Continuous, Mayito Rodriguez MD  •  sodium chloride 0.9 % infusion, , , Continuous PRN, Mayito Rodriguez MD, Last Rate: 30 mL/hr at 12/04/18 0937, 30 mL/hr at 12/04/18 0937     Allergies: Patient has no known allergies.      Vital Signs   Temp:  [98 °F (36.7 °C)] 98 °F (36.7 °C)  Heart Rate:  [50-61] 50  Resp:   "[16] 16  BP: (115-128)/(73-78) 115/73  Flowsheet Rows      First Filed Value   Admission Height  175.3 cm (69\") Documented at 12/04/2018 0827   Admission Weight  68 kg (150 lb) Documented at 12/04/2018 0827          General Appearance:    Alert, cooperative, in no acute distress   Head:    Normocephalic, without obvious abnormality, atraumatic       Neck:   No adenopathy, supple, no thyromegaly, no carotid bruit, no    JVD   Lungs:     Clear to auscultation bilaterally, no wheezes, rales, or     rhonchi    Heart:    Normal rate, regular rhythm,  No murmur, rub, or gallop   Chest Wall:    No abnormalities observed   Abdomen:     Normal bowel sounds, soft, non-tender, non-distended,            no rebound tenderness   Extremities:   No cyanosis, clubbing, or edema   Pulses:   Pulses palpable and equal bilaterally   Skin:   No bleeding or rash   Lymph nodes:   No cervical adenopathy   Neurologic:   Cranial nerves 2 - 12 grossly intact, sensation intact               Results Review: I personally viewed and interpreted the patient's EKG/Telemetry data            No results found for: TROPONINT        Assessment/Plan   1.  TIA  2.  Parkinson's  3.  Hyperlipidemia    Plan on implantable recording device today. LINQ    I discussed the patients findings and my recommendations with the patient            "

## 2018-12-12 ENCOUNTER — CLINICAL SUPPORT NO REQUIREMENTS (OUTPATIENT)
Dept: CARDIOLOGY | Facility: CLINIC | Age: 68
End: 2018-12-12

## 2018-12-12 DIAGNOSIS — I63.9 CRYPTOGENIC STROKE (HCC): Primary | ICD-10-CM

## 2018-12-12 PROCEDURE — 93285 PRGRMG DEV EVAL SCRMS IP: CPT | Performed by: INTERNAL MEDICINE

## 2018-12-15 ENCOUNTER — CLINICAL SUPPORT NO REQUIREMENTS (OUTPATIENT)
Dept: CARDIOLOGY | Facility: CLINIC | Age: 68
End: 2018-12-15

## 2018-12-15 DIAGNOSIS — I63.9 CRYPTOGENIC STROKE (HCC): Primary | ICD-10-CM

## 2018-12-20 ENCOUNTER — OFFICE VISIT (OUTPATIENT)
Dept: INTERNAL MEDICINE | Age: 68
End: 2018-12-20

## 2018-12-20 VITALS
HEIGHT: 69 IN | OXYGEN SATURATION: 99 % | WEIGHT: 152 LBS | BODY MASS INDEX: 22.51 KG/M2 | DIASTOLIC BLOOD PRESSURE: 82 MMHG | HEART RATE: 80 BPM | TEMPERATURE: 97.9 F | SYSTOLIC BLOOD PRESSURE: 134 MMHG

## 2018-12-20 DIAGNOSIS — Z00.00 MEDICARE ANNUAL WELLNESS VISIT, SUBSEQUENT: Primary | ICD-10-CM

## 2018-12-20 DIAGNOSIS — Z23 ENCOUNTER FOR IMMUNIZATION: ICD-10-CM

## 2018-12-20 DIAGNOSIS — R53.83 OTHER FATIGUE: ICD-10-CM

## 2018-12-20 DIAGNOSIS — R41.3 MEMORY CHANGES: ICD-10-CM

## 2018-12-20 DIAGNOSIS — M79.10 MYALGIA: ICD-10-CM

## 2018-12-20 LAB
DEVELOPER EXPIRATION DATE: NORMAL
DEVELOPER LOT NUMBER: NORMAL
EXPIRATION DATE: NORMAL
FECAL OCCULT BLOOD SCREEN, POC: NEGATIVE
Lab: NORMAL
NEGATIVE CONTROL: NEGATIVE
POSITIVE CONTROL: POSITIVE

## 2018-12-20 PROCEDURE — 90732 PPSV23 VACC 2 YRS+ SUBQ/IM: CPT | Performed by: INTERNAL MEDICINE

## 2018-12-20 PROCEDURE — G0439 PPPS, SUBSEQ VISIT: HCPCS | Performed by: INTERNAL MEDICINE

## 2018-12-20 PROCEDURE — 99214 OFFICE O/P EST MOD 30 MIN: CPT | Performed by: INTERNAL MEDICINE

## 2018-12-20 PROCEDURE — 82270 OCCULT BLOOD FECES: CPT | Performed by: INTERNAL MEDICINE

## 2018-12-20 PROCEDURE — G0009 ADMIN PNEUMOCOCCAL VACCINE: HCPCS | Performed by: INTERNAL MEDICINE

## 2018-12-20 RX ORDER — ATORVASTATIN CALCIUM 80 MG/1
80 TABLET, FILM COATED ORAL DAILY
COMMUNITY
End: 2019-01-18

## 2018-12-20 NOTE — PROGRESS NOTES
QUICK REFERENCE INFORMATION:  The ABCs of the Annual Wellness Visit    Subsequent Medicare Wellness Visit    HEALTH RISK ASSESSMENT    1950    Recent Hospitalizations:  Recently treated at the following:  Other: [facility].        Current Medical Providers:  Patient Care Team:  Miles Monae MD as PCP - General (Internal Medicine)  Edu Farrar MD as PCP - Claims Attributed  Edu Farrar MD as Consulting Physician (Neurology)        Smoking Status:  Social History     Tobacco Use   Smoking Status Never Smoker   Smokeless Tobacco Never Used   Tobacco Comment    CAFFEINE USE       Alcohol Consumption:  Social History     Substance and Sexual Activity   Alcohol Use No    Comment: Former use       Depression Screen:   PHQ-2/PHQ-9 Depression Screening 12/20/2018   Little interest or pleasure in doing things 0   Feeling down, depressed, or hopeless 0   Total Score 0       Health Habits and Functional and Cognitive Screening:  Functional & Cognitive Status 12/20/2018   Do you have difficulty preparing food and eating? No   Do you have difficulty bathing yourself, getting dressed or grooming yourself? No   Do you have difficulty using the toilet? No   Do you have difficulty moving around from place to place? No   Do you have trouble with steps or getting out of a bed or a chair? No   In the past year have you fallen or experienced a near fall? Yes   Do you need help using the phone?  No   Are you deaf or do you have serious difficulty hearing?  No   Do you need help with transportation? Yes   Do you need help shopping? Yes   Do you need help preparing meals?  Yes   Do you need help with housework?  Yes   Do you need help with laundry? Yes   Do you need help taking your medications? No   Do you need help managing money? No   Do you ever drive or ride in a car without wearing a seat belt? No   Do you have difficulty concentrating, remembering or making decisions? Yes           Does the patient have evidence of  cognitive impairment? Yes    Aspirin use counseling: Taking ASA appropriately as indicated      Recent Lab Results:  CMP:  Lab Results   Component Value Date     (H) 05/17/2017    BUN 17 05/17/2017    CREATININE 1.17 05/17/2017    EGFRIFNONA 62 05/17/2017    EGFRIFAFRI 76 05/17/2017    BCR 14.5 05/17/2017     05/17/2017    K 4.2 05/17/2017    CO2 26.7 05/17/2017    CALCIUM 9.4 05/17/2017    PROTENTOTREF 6.8 05/17/2017    ALBUMIN 4.30 05/17/2017    LABGLOBREF 2.5 05/17/2017    LABIL2 1.7 05/17/2017    BILITOT 0.5 05/17/2017    ALKPHOS 63 05/17/2017    AST 27 05/17/2017    ALT <5 05/17/2017     Lipid Panel:  Lab Results   Component Value Date    TRIG 41 05/17/2017    HDL 71 (H) 05/17/2017    VLDL 8.2 05/17/2017    LDLHDL 1.5 12/11/2015     HbA1c:  Lab Results   Component Value Date    HGBA1C 5.6 12/11/2015       Visual Acuity:  No exam data present    Age-appropriate Screening Schedule:  Refer to the list below for future screening recommendations based on patient's age, sex and/or medical conditions. Orders for these recommended tests are listed in the plan section. The patient has been provided with a written plan.    Health Maintenance   Topic Date Due   • ZOSTER VACCINE (2 of 2) 11/26/2013   • PNEUMOCOCCAL VACCINES (65+ LOW/MEDIUM RISK) (2 of 2 - PPSV23) 05/17/2018   • LIPID PANEL  12/20/2019   • COLONOSCOPY  09/11/2023   • TDAP/TD VACCINES (3 - Td) 05/17/2027   • INFLUENZA VACCINE  Completed        Subjective   History of Present Illness    Rei Silverman is a 68 y.o. male who presents for an Subsequent Wellness Visit.    The following portions of the patient's history were reviewed and updated as appropriate: allergies, current medications, past family history, past medical history, past social history, past surgical history and problem list.    Outpatient Medications Prior to Visit   Medication Sig Dispense Refill   • atorvastatin (LIPITOR) 80 MG tablet Take 80 mg by mouth Daily.     •  Cyanocobalamin (VITAMIN B 12 PO) Take 1,000 mg by mouth Daily.     • Multiple Vitamins-Minerals (MULTIVITAMIN ADULT PO) Take  by mouth Daily.     • Ascorbic Acid (VITAMIN C) 500 MG capsule Take 1 capsule by mouth daily.     • aspirin 81 MG tablet Take 81 mg by mouth Daily.     • carbidopa-levodopa-entacapone (STALEVO) 37.5-150-200 MG per tablet Take 1 tablet by mouth 3 (Three) Times a Day. 270 tablet 2   • finasteride (PROSCAR) 5 MG tablet TAKE ONE TABLET BY MOUTH DAILY 90 tablet 0   • Magnesium 400 MG capsule Take 1 capsule by mouth daily.     • melatonin tablet Take 6 mg by mouth Daily.     • Multiple Vitamins-Minerals (CENTRUM SILVER) tablet Take 1 tablet by mouth daily.     • rasagiline (AZILECT) 1 MG tablet Take 1 tablet by mouth Daily. 90 tablet 2   • rotigotine (NEUPRO) 4 MG/24HR patch Place 1 patch on the skin as directed by provider Daily. 90 patch 3   • tamsulosin (FLOMAX) 0.4 MG capsule 24 hr capsule TAKE ONE CAPSULE BY MOUTH DAILY 90 capsule 3   • Unable to find 1 each 1 (One) Time. Med Name: Protandum     • vitamin E 400 UNIT capsule Take 1 capsule by mouth daily.     • atorvastatin (LIPITOR) 80 MG tablet TAKE ONE TABLET BY MOUTH DAILY 30 tablet 2   • Cholecalciferol (VITAMIN D-3) 1000 UNITS capsule Take 1 capsule by mouth daily.     • trimethobenzamide (TIGAN) 300 MG capsule Take 1 capsule by mouth 3 (Three) Times a Day. 90 capsule 4     No facility-administered medications prior to visit.        Patient Active Problem List   Diagnosis   • Benign prostatic hyperplasia   • Cortical dysplasia (CMS/HCC)   • Hyperlipidemia   • Impaired fasting glucose   • Parkinson's disease (CMS/HCC)   • Restless legs syndrome   • Routine health maintenance   • History of colon polyps   • TIA (transient ischemic attack)   • Cerebrovascular accident (CVA) due to embolism of precerebral artery (CMS/HCC)   • Paroxysmal atrial fibrillation (CMS/HCC)       Advance Care Planning:  has an advance directive - a copy has been  "provided and is in file    Identification of Risk Factors:  Risk factors include: cognitive impairment.    Review of Systems    Compared to one year ago, the patient feels his physical health is worse.  Compared to one year ago, the patient feels his mental health is worse.    Objective     Physical Exam    Vitals:    12/20/18 1253   BP: 134/82   Pulse: 80   Temp: 97.9 °F (36.6 °C)   SpO2: 99%   Weight: 68.9 kg (152 lb)   Height: 175.3 cm (69.02\")   PainSc: 0-No pain       Patient's Body mass index is 22.44 kg/m². BMI is within normal parameters. No follow-up required.      Assessment/Plan   Patient Self-Management and Personalized Health Advice  The patient has been provided with information about: mental health concerns and preventive services including:   · Recommend shingles shots after the holidays..    Visit Diagnoses:    ICD-10-CM ICD-9-CM   1. Medicare annual wellness visit, subsequent Z00.00 V70.0   2. Encounter for immunization Z23 V03.89   3. Memory changes R41.3 780.93   4. Myalgia M79.10 729.1       No orders of the defined types were placed in this encounter.      Outpatient Encounter Medications as of 12/20/2018   Medication Sig Dispense Refill   • atorvastatin (LIPITOR) 80 MG tablet Take 80 mg by mouth Daily.     • Cyanocobalamin (VITAMIN B 12 PO) Take 1,000 mg by mouth Daily.     • Multiple Vitamins-Minerals (MULTIVITAMIN ADULT PO) Take  by mouth Daily.     • Ascorbic Acid (VITAMIN C) 500 MG capsule Take 1 capsule by mouth daily.     • aspirin 81 MG tablet Take 81 mg by mouth Daily.     • carbidopa-levodopa-entacapone (STALEVO) 37.5-150-200 MG per tablet Take 1 tablet by mouth 3 (Three) Times a Day. 270 tablet 2   • finasteride (PROSCAR) 5 MG tablet TAKE ONE TABLET BY MOUTH DAILY 90 tablet 0   • Magnesium 400 MG capsule Take 1 capsule by mouth daily.     • melatonin tablet Take 6 mg by mouth Daily.     • Multiple Vitamins-Minerals (CENTRUM SILVER) tablet Take 1 tablet by mouth daily.     • " rasagiline (AZILECT) 1 MG tablet Take 1 tablet by mouth Daily. 90 tablet 2   • rotigotine (NEUPRO) 4 MG/24HR patch Place 1 patch on the skin as directed by provider Daily. 90 patch 3   • tamsulosin (FLOMAX) 0.4 MG capsule 24 hr capsule TAKE ONE CAPSULE BY MOUTH DAILY 90 capsule 3   • Unable to find 1 each 1 (One) Time. Med Name: Protandum     • vitamin E 400 UNIT capsule Take 1 capsule by mouth daily.     • [DISCONTINUED] atorvastatin (LIPITOR) 80 MG tablet TAKE ONE TABLET BY MOUTH DAILY 30 tablet 2   • [DISCONTINUED] Cholecalciferol (VITAMIN D-3) 1000 UNITS capsule Take 1 capsule by mouth daily.     • [DISCONTINUED] trimethobenzamide (TIGAN) 300 MG capsule Take 1 capsule by mouth 3 (Three) Times a Day. 90 capsule 4     No facility-administered encounter medications on file as of 12/20/2018.        Reviewed use of high risk medication in the elderly: yes  Reviewed for potential of harmful drug interactions in the elderly: yes    Follow Up:  1 year    An After Visit Summary and PPPS with all of these plans were given to the patient.

## 2018-12-20 NOTE — PROGRESS NOTES
Subjective   Rei Silverman is a 68 y.o. male.     History of Present Illness patient presents this afternoon for subsequent annual Medicare wellness evaluation.    Health maintenance: Last ophthalmology visit within the past year, dentist within 6 months.  Exercise regularly with walking ×6 days per week.    Immunization update, we will provide Pneumovax today, and patient was advised to have the new shingles vaccine after the holidays.    Memory change both patient and his spouse have noted impairment of memory over the past 6 months or so.  There is no family history of dementia to his knowledge, but he has had a TIA in the past.  There haven't been issues with driving, getting lost, difficulty with computations, repetitive questioning, he is also noted difficulty with working with a computer.  He is a retired pharmacist.    Myalgia this is been noted over the past 3-4 months since she's been on high-dose Lipitor.  She has never tried alternate day therapy nor has he tried discontinuing the statin because of these complaints.    The following portions of the patient's history were reviewed and updated as appropriate: allergies, current medications, past family history, past medical history, past social history, past surgical history and problem list.    Review of Systems   Constitutional: Positive for fatigue.   HENT: Negative.    Eyes: Positive for visual disturbance.        Patient has history of retinal detachment, patient has been impaired since the repair process.   Respiratory: Negative.    Cardiovascular: Negative.    Gastrointestinal: Negative.    Endocrine: Negative.    Genitourinary: Negative.    Musculoskeletal: Negative.    Skin:        Patient has recently been seen by the dermatologist.   Allergic/Immunologic: Negative for immunocompromised state.   Neurological: Negative.    Hematological: Negative.    Psychiatric/Behavioral: Negative.        Objective   Physical Exam   Constitutional: He is  oriented to person, place, and time. He appears well-developed and well-nourished. No distress.   HENT:   Head: Normocephalic and atraumatic.   Right Ear: Tympanic membrane, external ear and ear canal normal.   Mouth/Throat: Uvula is midline, oropharynx is clear and moist and mucous membranes are normal.   Cerumen noted left ear   Eyes: Conjunctivae and EOM are normal. Pupils are equal, round, and reactive to light.   Neck: Normal range of motion. Neck supple. No JVD present. Carotid bruit is not present. No thyromegaly present.   Cardiovascular: Normal rate, regular rhythm, normal heart sounds and intact distal pulses. Exam reveals no gallop and no friction rub.   No murmur heard.  Cardiac rhythm monitoring device inserted in left anterior chest, wound appears clean..   Pulmonary/Chest: Effort normal and breath sounds normal. He has no wheezes. He has no rales.   Abdominal: Soft. Bowel sounds are normal. There is no hepatosplenomegaly. There is no tenderness. Hernia confirmed negative in the right inguinal area and confirmed negative in the left inguinal area.   Genitourinary: Rectum normal, prostate normal, testes normal and penis normal.   Genitourinary Comments: No nodules noted on digital rectal exam.   Musculoskeletal: Normal range of motion. He exhibits no edema or deformity.   Lymphadenopathy:     He has no cervical adenopathy.   Neurological: He is alert and oriented to person, place, and time. He has normal strength and normal reflexes. No cranial nerve deficit or sensory deficit. Coordination normal.   Skin: Skin is warm and dry. No rash noted.   Psychiatric: He has a normal mood and affect. His speech is normal and behavior is normal. Judgment and thought content normal. Cognition and memory are normal.   Nursing note and vitals reviewed.      Assessment/Plan   Rei was seen today for annual exam.    Diagnoses and all orders for this visit:    Medicare annual wellness visit, subsequent  -      Comprehensive Metabolic Panel  -     Lipid Panel  -     PSA DIAGNOSTIC  -     POC Occult Blood Stool    Encounter for immunization  -     Pneumococcal Polysaccharide Vaccine 23-Valent Greater Than or Equal To 3yo Subcutaneous / IM    Memory changes  -     CBC & Differential  -     RPR  -     Protein Electrophoresis, Total  -     Vitamin B12  -     TSH  -     T4, free  -     CT Head Without Contrast    Myalgia  -     Urinalysis With Microscopic If Indicated (No Culture) - Urine, Clean Catch  -     Sedimentation Rate  -     CK  -     Aldolase    Other fatigue      Number-one subsequent annual Medicare wellness evaluation, clinically stable, see comments above and below recommend Debrox for ear wax, repeat exam one year, laboratory as above.  Patient will be asked to sign an ABN for CMP and lipid.  Patient already has an appointment with a new physician, Dr. Fernandez.    Immunization update, Pneumovax today.    #3 memory changes.  This may be due to previous TIA, aging, but I see no evidence of acute infection at this time, we'll evaluate with laboratory as above along with CT scan of the brain.  We will check a Mini-Mental status exam within the next month or so.  At that point decide whether or not treatment with medication is indicated.      #4  myalgia most likely due to statin medication, recommend patient discontinue statin at this point, check UA sedimentation rate CK aldolase and renal function.  He will follow-up with me by phone in approximately 4 weeks to let me know if the symptoms have improved at that time decide regarding restarting medication on a alternate day therapy.    #5 fatigue will check for CBC as above along with thyroid function and electrolytes.  .

## 2018-12-20 NOTE — PATIENT INSTRUCTIONS
Medicare Wellness  Personal Prevention Plan of Service     Date of Office Visit:  2018  Encounter Provider:  Miles Monae MD  Place of Service:  [facility]  Patient Name: Rei Silverman  :  1950    As part of the Medicare Wellness portion of your visit today, we are providing you with this personalized preventive plan of services (PPPS). This plan is based upon recommendations of the [place] Preventive Services Task Force (USPSTF) and the Advisory Committee on Immunization Practices (ACIP).    This lists the preventive care services that should be considered, and provides dates of when you are due. Items listed as completed are up-to-date and do not require any further intervention.    Health Maintenance   Topic Date Due   • ZOSTER VACCINE (2 of 2) 2013   • PNEUMOCOCCAL VACCINES (65+ LOW/MEDIUM RISK) (2 of 2 - PPSV23) 2018   • MEDICARE ANNUAL WELLNESS  2019   • LIPID PANEL  2019   • COLONOSCOPY  2023   • TDAP/TD VACCINES (3 - Td) 2027   • INFLUENZA VACCINE  Completed   • HEPATITIS A VACCINE ADULT  Completed   • HEPATITIS C SCREENING  Addressed       Orders Placed This Encounter   Procedures   • CT Head Without Contrast   • Pneumococcal Polysaccharide Vaccine 23-Valent Greater Than or Equal To 3yo Subcutaneous / IM   • Comprehensive Metabolic Panel   • Lipid Panel   • Urinalysis With Microscopic If Indicated (No Culture) - Urine, Clean Catch   • PSA DIAGNOSTIC   • RPR   • Protein Electrophoresis, Total   • Vitamin B12   • TSH   • T4, free   • Sedimentation Rate   • CK   • Aldolase   • POC Occult Blood Stool   • CBC & Differential     Order Specific Question:   Manual Differential     Answer:   No       No Follow-up on file.

## 2018-12-21 LAB
ALBUMIN SERPL ELPH-MCNC: 4.1 G/DL (ref 2.9–4.4)
ALBUMIN SERPL-MCNC: 4.4 G/DL (ref 3.5–5.2)
ALBUMIN/GLOB SERPL: 1.2 {RATIO} (ref 0.7–1.7)
ALBUMIN/GLOB SERPL: 1.5 G/DL
ALDOLASE SERPL-CCNC: 4.2 U/L (ref 3.3–10.3)
ALP SERPL-CCNC: 96 U/L (ref 39–117)
ALPHA1 GLOB SERPL ELPH-MCNC: 0.2 G/DL (ref 0–0.4)
ALPHA2 GLOB SERPL ELPH-MCNC: 0.7 G/DL (ref 0.4–1)
ALT SERPL-CCNC: 21 U/L (ref 1–41)
APPEARANCE UR: CLEAR
AST SERPL-CCNC: 39 U/L (ref 1–40)
B-GLOBULIN SERPL ELPH-MCNC: 1 G/DL (ref 0.7–1.3)
BASOPHILS # BLD AUTO: 0.05 10*3/MM3 (ref 0–0.2)
BASOPHILS NFR BLD AUTO: 1 % (ref 0–1.5)
BILIRUB SERPL-MCNC: 0.5 MG/DL (ref 0.1–1.2)
BILIRUB UR QL STRIP: NEGATIVE
BUN SERPL-MCNC: 15 MG/DL (ref 8–23)
BUN/CREAT SERPL: 12.7 (ref 7–25)
CALCIUM SERPL-MCNC: 9.1 MG/DL (ref 8.6–10.5)
CHLORIDE SERPL-SCNC: 100 MMOL/L (ref 98–107)
CHOLEST SERPL-MCNC: 138 MG/DL (ref 0–200)
CK SERPL-CCNC: 284 U/L (ref 20–200)
CO2 SERPL-SCNC: 26.4 MMOL/L (ref 22–29)
COLOR UR: YELLOW
CREAT SERPL-MCNC: 1.18 MG/DL (ref 0.76–1.27)
EOSINOPHIL # BLD AUTO: 0.07 10*3/MM3 (ref 0–0.7)
EOSINOPHIL NFR BLD AUTO: 1.4 % (ref 0.3–6.2)
ERYTHROCYTE [DISTWIDTH] IN BLOOD BY AUTOMATED COUNT: 12.2 % (ref 11.5–14.5)
ERYTHROCYTE [SEDIMENTATION RATE] IN BLOOD BY WESTERGREN METHOD: 5 MM/HR (ref 0–20)
GAMMA GLOB SERPL ELPH-MCNC: 1.3 G/DL (ref 0.4–1.8)
GLOBULIN SER CALC-MCNC: 3 GM/DL
GLOBULIN SER CALC-MCNC: 3.3 G/DL (ref 2.2–3.9)
GLUCOSE SERPL-MCNC: 84 MG/DL (ref 65–99)
GLUCOSE UR QL: NEGATIVE
HCT VFR BLD AUTO: 41.1 % (ref 40.4–52.2)
HDLC SERPL-MCNC: 70 MG/DL (ref 40–60)
HGB BLD-MCNC: 14.4 G/DL (ref 13.7–17.6)
HGB UR QL STRIP: NEGATIVE
IMM GRANULOCYTES # BLD: 0.01 10*3/MM3 (ref 0–0.03)
IMM GRANULOCYTES NFR BLD: 0.2 % (ref 0–0.5)
KETONES UR QL STRIP: NEGATIVE
LABORATORY COMMENT REPORT: NORMAL
LDLC SERPL CALC-MCNC: 55 MG/DL (ref 0–100)
LEUKOCYTE ESTERASE UR QL STRIP: NEGATIVE
LYMPHOCYTES # BLD AUTO: 1.41 10*3/MM3 (ref 0.9–4.8)
LYMPHOCYTES NFR BLD AUTO: 28.6 % (ref 19.6–45.3)
M PROTEIN SERPL ELPH-MCNC: NORMAL G/DL
MCH RBC QN AUTO: 34 PG (ref 27–32.7)
MCHC RBC AUTO-ENTMCNC: 35 G/DL (ref 32.6–36.4)
MCV RBC AUTO: 96.9 FL (ref 79.8–96.2)
MONOCYTES # BLD AUTO: 0.47 10*3/MM3 (ref 0.2–1.2)
MONOCYTES NFR BLD AUTO: 9.5 % (ref 5–12)
NEUTROPHILS # BLD AUTO: 2.93 10*3/MM3 (ref 1.9–8.1)
NEUTROPHILS NFR BLD AUTO: 59.5 % (ref 42.7–76)
NITRITE UR QL STRIP: NEGATIVE
PH UR STRIP: 7 [PH] (ref 5–8)
PLATELET # BLD AUTO: 315 10*3/MM3 (ref 140–500)
POTASSIUM SERPL-SCNC: 4.1 MMOL/L (ref 3.5–5.2)
PROT SERPL-MCNC: 7.4 G/DL (ref 6–8.5)
PROT UR QL STRIP: NEGATIVE
PSA SERPL-MCNC: 0.25 NG/ML (ref 0–4)
RBC # BLD AUTO: 4.24 10*6/MM3 (ref 4.6–6)
RPR SER QL: NORMAL
SODIUM SERPL-SCNC: 141 MMOL/L (ref 136–145)
SP GR UR: 1.02 (ref 1–1.03)
T4 FREE SERPL-MCNC: 1.36 NG/DL (ref 0.93–1.7)
TRIGL SERPL-MCNC: 63 MG/DL (ref 0–150)
TSH SERPL DL<=0.005 MIU/L-ACNC: 1.72 MIU/ML (ref 0.27–4.2)
UROBILINOGEN UR STRIP-MCNC: NORMAL MG/DL
VIT B12 SERPL-MCNC: 1271 PG/ML (ref 211–946)
VLDLC SERPL CALC-MCNC: 12.6 MG/DL (ref 5–40)
WBC # BLD AUTO: 4.93 10*3/MM3 (ref 4.5–10.7)

## 2019-01-11 ENCOUNTER — HOSPITAL ENCOUNTER (OUTPATIENT)
Dept: CT IMAGING | Facility: HOSPITAL | Age: 69
Discharge: HOME | End: 2019-01-11
Admitting: INTERNAL MEDICINE

## 2019-01-11 PROCEDURE — 70450 CT HEAD/BRAIN W/O DYE: CPT

## 2019-01-12 DIAGNOSIS — R90.89 ABNORMAL CENTRAL NERVOUS SYSTEM DIAGNOSTIC IMAGING: Primary | ICD-10-CM

## 2019-01-18 ENCOUNTER — OFFICE VISIT (OUTPATIENT)
Dept: INTERNAL MEDICINE | Age: 69
End: 2019-01-18

## 2019-01-18 VITALS
WEIGHT: 154 LBS | SYSTOLIC BLOOD PRESSURE: 94 MMHG | HEIGHT: 69 IN | OXYGEN SATURATION: 99 % | DIASTOLIC BLOOD PRESSURE: 64 MMHG | HEART RATE: 82 BPM | BODY MASS INDEX: 22.81 KG/M2 | TEMPERATURE: 97.6 F

## 2019-01-18 DIAGNOSIS — G31.84 MILD COGNITIVE IMPAIRMENT: Primary | ICD-10-CM

## 2019-01-18 PROCEDURE — 99213 OFFICE O/P EST LOW 20 MIN: CPT | Performed by: INTERNAL MEDICINE

## 2019-01-18 NOTE — PROGRESS NOTES
"[facility] INTERNAL MEDICINE  MD Rei GARZA Harned / 68 y.o. / male  01/18/2019      ASSESSMENT & PLAN:    Problem List Items Addressed This Visit     None      Visit Diagnoses     Mild cognitive impairment    -  Primary        No orders of the defined types were placed in this encounter.      Summary/Discussion: Number-one mild cognitive impairment.  Patient is aware that mild cognitive impairments of fluid state, patients can revert back to normal function depending upon ongoing issues (i.e. infection or electrolyte abnormalities).  This may be due to underlying Parkinson's, or side effect of medication as well.  Patient prefers to continue with mental stimulation and not use medication at this time.  I think that is appropriate.  I suggested we reconvene in about 6 months for repeat exam see if condition has deteriorated or not.  I have offered Aricept to them and that choice will be reevaluated in the summer.    #2 myalgia.  Symptoms have not yet resolved after being off of the statin for 1 month.  CPK was mildly elevated however sedimentation rate and aldolase were normal as was the creatinine.  I suggest that we remain off of this for another month at least and discussed this with his new physician in the springtime here if symptoms worsen, he should be seen sooner.      Return in about 6 months (around 7/18/2019), or Dr Fernandez, for Recheck.    ____________________________________________________________________    VITALS    Visit Vitals  BP 94/64   Pulse 82   Temp 97.6 °F (36.4 °C)   Ht 175.3 cm (69.02\")   Wt 69.9 kg (154 lb)   SpO2 99%   BMI 22.73 kg/m²       BP Readings from Last 3 Encounters:   01/18/19 94/64   12/20/18 134/82   12/04/18 118/68     Wt Readings from Last 3 Encounters:   01/18/19 69.9 kg (154 lb)   12/20/18 68.9 kg (152 lb)   12/04/18 68 kg (150 lb)      Body mass index is 22.73 kg/m².    CC:  Main reason(s) for today's visit: Memory Loss and Results (CT)      HPI: Patient returns " this morning in follow-up of our last office visit from December 20.  That time he noted some memory changes.  Evaluation was undertaken to include CBC RPR SPEP B-12 TSH T4 all unremarkable.  CT scan showed an area of question for which they suggested a repeat MRI however patient had 2 MRIs in August while at [facility] which showed absolutely nothing acutely abnormal..  I do not feel another MRI is indicated at this time.    Currently patient is very active with walking on a regular basis and does stimulate his brain with varied activities at home.  We also talked about the possibility of August books since his eyesight sometimes is an issue.        Patient Care Team:  Miles Monae MD as PCP - General (Internal Medicine)  Edu Farrar MD as Consulting Physician (Neurology)  ____________________________________________________________________    REVIEW OF SYSTEMS    Review of Systems   Constitutional: Negative for appetite change.   Psychiatric/Behavioral: Negative for sleep disturbance.    N/C       PHYSICAL EXAMINATION    Physical Exam   Constitutional: He is oriented to person, place, and time. He appears well-developed and well-nourished.   Neurological: He is alert and oriented to person, place, and time.   Skin: Skin is warm and dry.   Psychiatric: He has a normal mood and affect. His behavior is normal.     Mini-Mental Status exam score 24/30     REVIEWED DATA:    Labs:     Lab Results   Component Value Date     12/20/2018    K 4.1 12/20/2018    AST 39 12/20/2018    ALT 21 12/20/2018    BUN 15 12/20/2018    CREATININE 1.18 12/20/2018    CREATININE 1.17 05/17/2017    CREATININE 1.05 12/11/2015    EGFRIFNONA 61 12/20/2018    EGFRIFAFRI 74 12/20/2018       Lab Results   Component Value Date    HGBA1C 5.6 12/11/2015       Lab Results   Component Value Date    LDL 55 12/20/2018     (H) 05/17/2017     (H) 12/11/2015    HDL 70 (H) 12/20/2018    TRIG 63 12/20/2018       Lab Results   Component  Value Date    TSH 1.720 12/20/2018    FREET4 1.36 12/20/2018       Lab Results   Component Value Date    WBC 4.93 12/20/2018    HGB 14.4 12/20/2018    HGB 13.4 (L) 05/17/2017    HGB 13.5 12/11/2015     12/20/2018       Lab Results   Component Value Date    PSA 0.248 12/20/2018         Imaging:         Medical Tests:         Summary of old records / correspondence / consultant report:         Request outside records:         ALLERGIES  No Known Allergies     MEDICATIONS  Current Outpatient Medications   Medication Sig Dispense Refill   • Ascorbic Acid (VITAMIN C) 500 MG capsule Take 1 capsule by mouth daily.     • aspirin 81 MG tablet Take 81 mg by mouth Daily.     • carbidopa-levodopa-entacapone (STALEVO) 37.5-150-200 MG per tablet Take 1 tablet by mouth 3 (Three) Times a Day. 270 tablet 2   • Cyanocobalamin (VITAMIN B 12 PO) Take 1,000 mg by mouth Daily.     • finasteride (PROSCAR) 5 MG tablet TAKE ONE TABLET BY MOUTH DAILY 90 tablet 0   • Magnesium 400 MG capsule Take 1 capsule by mouth daily.     • melatonin tablet Take 6 mg by mouth Daily.     • Multiple Vitamins-Minerals (MULTIVITAMIN ADULT PO) Take  by mouth Daily.     • rasagiline (AZILECT) 1 MG tablet Take 1 tablet by mouth Daily. 90 tablet 2   • rotigotine (NEUPRO) 4 MG/24HR patch Place 1 patch on the skin as directed by provider Daily. 90 patch 3   • tamsulosin (FLOMAX) 0.4 MG capsule 24 hr capsule TAKE ONE CAPSULE BY MOUTH DAILY 90 capsule 3   • Unable to find 1 each 1 (One) Time. Med Name: Protandum     • vitamin E 400 UNIT capsule Take 1 capsule by mouth daily.       No current facility-administered medications for this visit.        PFSH:     The following portions of the patient's history were reviewed and updated as appropriate: Allergies / Current Medications / Past Medical History / Surgical History / Social History / Family History    PROBLEM LIST   Patient Active Problem List   Diagnosis   • Benign prostatic hyperplasia   • Cortical  dysplasia (CMS/HCC)   • Hyperlipidemia   • Impaired fasting glucose   • Parkinson's disease (CMS/HCC)   • Restless legs syndrome   • Routine health maintenance   • History of colon polyps   • TIA (transient ischemic attack)   • Cerebrovascular accident (CVA) due to embolism of precerebral artery (CMS/HCC)   • Paroxysmal atrial fibrillation (CMS/HCC)   • Abnormal central nervous system diagnostic imaging       PAST MEDICAL HISTORY  Past Medical History:   Diagnosis Date   • Cataract    • Colon polyps    • Essential tremor    • Hyperlipidemia    • Parkinson's disease (CMS/HCC)    • Septic joint of left elbow (CMS/HCC)     2011   • Stroke (CMS/HCC)        SURGICAL HISTORY  Past Surgical History:   Procedure Laterality Date   • CARDIAC ELECTROPHYSIOLOGY PROCEDURE N/A 12/4/2018    Procedure: Loop insertion;  Surgeon: Mayito Rodriguez MD;  Location: [facility] INVASIVE LOCATION;  Service: Cardiovascular   • COLONOSCOPY     • ELBOW DEBRIDEMENT     • NASAL SINUS SURGERY     • RETINAL DETACHMENT SURGERY         SOCIAL HISTORY  Social History     Socioeconomic History   • Marital status:      Spouse name: Not on file   • Number of children: 2   • Years of education: Not on file   • Highest education level: Not on file   Occupational History   • Occupation: Retired   Tobacco Use   • Smoking status: Never Smoker   • Smokeless tobacco: Never Used   • Tobacco comment: CAFFEINE USE   Substance and Sexual Activity   • Alcohol use: No     Comment: Former use   • Drug use: No     Comment: Former use   • Sexual activity: Defer       FAMILY HISTORY  Family History   Problem Relation Age of Onset   • Breast cancer Mother    • Hypertension Mother    • Coronary artery disease Father         MI   • Depression Father    • Diabetes Father    • Hypertension Father    • Depression Sister        Health Maintenance Due   Topic   • ZOSTER VACCINE (2 of 2)       Overdue health maintenance interventions  reviewed with  patient.    Dictated utilizing Dragon voice recognition software

## 2019-01-21 ENCOUNTER — TELEPHONE (OUTPATIENT)
Dept: CARDIOLOGY | Facility: CLINIC | Age: 69
End: 2019-01-21

## 2019-01-21 ENCOUNTER — CLINICAL SUPPORT NO REQUIREMENTS (OUTPATIENT)
Dept: CARDIOLOGY | Facility: CLINIC | Age: 69
End: 2019-01-21

## 2019-01-21 DIAGNOSIS — I63.9 CRYPTOGENIC STROKE (HCC): Primary | ICD-10-CM

## 2019-01-21 RX ORDER — CEPHALEXIN 500 MG/1
500 CAPSULE ORAL 2 TIMES DAILY
Qty: 14 CAPSULE | Refills: 0 | Status: SHIPPED | OUTPATIENT
Start: 2019-01-21 | End: 2019-01-29 | Stop reason: HOSPADM

## 2019-01-21 NOTE — TELEPHONE ENCOUNTER
01/21/19  9:36 AM  Rei Silverman  1950    Home Phone [phone]   Mobile [phone]       Rei Silverman is a patient of Dr Claudio who had a link recorder placed 12/4/18 by Dr Rodriguez.  Patient is calling in today to report 1/2 inch diameter redness to implant site and pus.  He denies fever.    Do we need to bring him in for a site check?  Inna Evans RN  Triage nurse

## 2019-01-23 ENCOUNTER — TELEPHONE (OUTPATIENT)
Dept: CARDIOLOGY | Facility: CLINIC | Age: 69
End: 2019-01-23

## 2019-01-28 ENCOUNTER — TELEPHONE (OUTPATIENT)
Dept: CARDIOLOGY | Facility: CLINIC | Age: 69
End: 2019-01-28

## 2019-01-28 ENCOUNTER — CLINICAL SUPPORT NO REQUIREMENTS (OUTPATIENT)
Dept: CARDIOLOGY | Facility: CLINIC | Age: 69
End: 2019-01-28

## 2019-01-28 DIAGNOSIS — I63.9 CRYPTOGENIC STROKE (HCC): Primary | ICD-10-CM

## 2019-01-28 DIAGNOSIS — I63.9 CEREBROVASCULAR ACCIDENT (CVA), UNSPECIFIED MECHANISM (HCC): Primary | ICD-10-CM

## 2019-01-28 NOTE — TELEPHONE ENCOUNTER
01/28/19  9:13 AM  Rei Silverman  1950    Home Phone [phone]   Mobile [phone]       Rei Silverman is a patient of Dr. Rodriguez, and is calling in w/an update on his pacemaker site. This morning it is red and puffy, and he took his last antibiotic last night.  He is supposed to come in to pacemaker tomorrow, but wanted Dr. Rodriguez to know what is going on in case another intervention is needed.    Dr. Rodriguez, does he need a different antibiotic? Or more of the same?    Shanique Arriola RN

## 2019-01-28 NOTE — TELEPHONE ENCOUNTER
Had him brought in to pacemaker to look at it today. If they need someone to see it, they will grab one of the docs from pod 2.

## 2019-01-28 NOTE — TELEPHONE ENCOUNTER
I am going to want him seen. I am in [place] and will be unlikely to see tomorrow either. Please see if he can come in today and see if eliz, elizabeth, malu, or nadeen will look at it.

## 2019-01-29 ENCOUNTER — HOSPITAL ENCOUNTER (OUTPATIENT)
Facility: HOSPITAL | Age: 69
Setting detail: HOSPITAL OUTPATIENT SURGERY
Discharge: HOME | End: 2019-01-29
Attending: INTERNAL MEDICINE | Admitting: INTERNAL MEDICINE

## 2019-01-29 ENCOUNTER — CLINICAL SUPPORT NO REQUIREMENTS (OUTPATIENT)
Dept: CARDIOLOGY | Facility: CLINIC | Age: 69
End: 2019-01-29

## 2019-01-29 VITALS
RESPIRATION RATE: 16 BRPM | OXYGEN SATURATION: 95 % | BODY MASS INDEX: 22.22 KG/M2 | SYSTOLIC BLOOD PRESSURE: 112 MMHG | WEIGHT: 150 LBS | HEIGHT: 69 IN | TEMPERATURE: 98.1 F | HEART RATE: 70 BPM | DIASTOLIC BLOOD PRESSURE: 68 MMHG

## 2019-01-29 DIAGNOSIS — I63.9 CRYPTOGENIC STROKE (HCC): Primary | ICD-10-CM

## 2019-01-29 DIAGNOSIS — I63.9 CEREBROVASCULAR ACCIDENT (CVA), UNSPECIFIED MECHANISM (HCC): ICD-10-CM

## 2019-01-29 PROCEDURE — 33286 RMVL SUBQ CAR RHYTHM MNTR: CPT | Performed by: INTERNAL MEDICINE

## 2019-01-29 PROCEDURE — S0260 H&P FOR SURGERY: HCPCS | Performed by: INTERNAL MEDICINE

## 2019-01-29 PROCEDURE — 25010000002 FENTANYL CITRATE (PF) 100 MCG/2ML SOLUTION: Performed by: INTERNAL MEDICINE

## 2019-01-29 PROCEDURE — 93299 PR REM INTERROG ICPMS/SCRMS <30 D TECH REVIEW: CPT | Performed by: INTERNAL MEDICINE

## 2019-01-29 PROCEDURE — 25010000003 CEFAZOLIN IN DEXTROSE 2-4 GM/100ML-% SOLUTION: Performed by: INTERNAL MEDICINE

## 2019-01-29 PROCEDURE — 25010000002 MIDAZOLAM PER 1 MG: Performed by: INTERNAL MEDICINE

## 2019-01-29 PROCEDURE — 93298 REM INTERROG DEV EVAL SCRMS: CPT | Performed by: INTERNAL MEDICINE

## 2019-01-29 RX ORDER — SODIUM CHLORIDE 0.9 % (FLUSH) 0.9 %
3 SYRINGE (ML) INJECTION EVERY 12 HOURS SCHEDULED
Status: DISCONTINUED | OUTPATIENT
Start: 2019-01-29 | End: 2019-01-29 | Stop reason: HOSPADM

## 2019-01-29 RX ORDER — SODIUM CHLORIDE 0.9 % (FLUSH) 0.9 %
3-10 SYRINGE (ML) INJECTION AS NEEDED
Status: DISCONTINUED | OUTPATIENT
Start: 2019-01-29 | End: 2019-01-29 | Stop reason: HOSPADM

## 2019-01-29 RX ORDER — FENTANYL CITRATE 50 UG/ML
INJECTION, SOLUTION INTRAMUSCULAR; INTRAVENOUS AS NEEDED
Status: DISCONTINUED | OUTPATIENT
Start: 2019-01-29 | End: 2019-01-29 | Stop reason: HOSPADM

## 2019-01-29 RX ORDER — CEFAZOLIN SODIUM 2 G/100ML
INJECTION, SOLUTION INTRAVENOUS CONTINUOUS PRN
Status: COMPLETED | OUTPATIENT
Start: 2019-01-29 | End: 2019-01-29

## 2019-01-29 RX ORDER — MIDAZOLAM HYDROCHLORIDE 1 MG/ML
INJECTION INTRAMUSCULAR; INTRAVENOUS AS NEEDED
Status: DISCONTINUED | OUTPATIENT
Start: 2019-01-29 | End: 2019-01-29 | Stop reason: HOSPADM

## 2019-01-29 RX ORDER — SODIUM CHLORIDE 9 MG/ML
75 INJECTION, SOLUTION INTRAVENOUS CONTINUOUS
Status: DISCONTINUED | OUTPATIENT
Start: 2019-01-29 | End: 2019-01-29 | Stop reason: HOSPADM

## 2019-01-29 RX ORDER — LIDOCAINE HYDROCHLORIDE AND EPINEPHRINE 10; 10 MG/ML; UG/ML
INJECTION, SOLUTION INFILTRATION; PERINEURAL AS NEEDED
Status: DISCONTINUED | OUTPATIENT
Start: 2019-01-29 | End: 2019-01-29 | Stop reason: HOSPADM

## 2019-01-29 RX ORDER — LIDOCAINE HYDROCHLORIDE 10 MG/ML
0.1 INJECTION, SOLUTION EPIDURAL; INFILTRATION; INTRACAUDAL; PERINEURAL ONCE AS NEEDED
Status: DISCONTINUED | OUTPATIENT
Start: 2019-01-29 | End: 2019-01-29 | Stop reason: HOSPADM

## 2019-01-29 RX ADMIN — SODIUM CHLORIDE 75 ML/HR: 9 INJECTION, SOLUTION INTRAVENOUS at 08:08

## 2019-01-29 NOTE — H&P
Referring Provider:       Patient Care Team:  Miles Monae MD as PCP - General (Internal Medicine)  Edu Farrar MD as Consulting Physician (Neurology)      Chief complaint: Oozing from linq site    History of present illness:  68-year-old male with a medical history of Parkinson's, hyperlipidemia, and TIA who presents to me today for LINQ removal.  The patient has had the Linq place for almost 2 months.  He presented to me in late January with the complaint of erythema and occasional drainage from the site.  It had been well healed but he stated that it broke open.    Per his report he had a some improvement with antibiotics, however over the past 24-48 hours the erythema and drainage had started back up again.  He is scheduled for removal today.  He has no signs of systemic infection    Review of Systems  All other systems reviewed and negative.     Past Medical History:   Past Medical History:   Diagnosis Date   • Cataract    • Colon polyps    • Essential tremor    • Hyperlipidemia    • Parkinson's disease (CMS/HCC)    • Septic joint of left elbow (CMS/HCC)     2011   • Stroke (CMS/[facility])        Past Surgical History:   Past Surgical History:   Procedure Laterality Date   • CARDIAC ELECTROPHYSIOLOGY PROCEDURE N/A 12/4/2018    Procedure: Loop insertion;  Surgeon: Mayito Rodriguez MD;  Location: [facility] INVASIVE LOCATION;  Service: Cardiovascular   • COLONOSCOPY     • ELBOW DEBRIDEMENT     • NASAL SINUS SURGERY     • RETINAL DETACHMENT SURGERY         Family History:   Family History   Problem Relation Age of Onset   • Breast cancer Mother    • Hypertension Mother    • Coronary artery disease Father         MI   • Depression Father    • Diabetes Father    • Hypertension Father    • Depression Sister        Social History:   Social History     Tobacco Use   • Smoking status: Never Smoker   • Smokeless tobacco: Never Used   • Tobacco comment: CAFFEINE USE   Substance Use Topics   • Alcohol use: No      Comment: Former use   • Drug use: No     Comment: Former use       Home Medications:   Medications Prior to Admission   Medication Sig Dispense Refill Last Dose   • Ascorbic Acid (VITAMIN C) 500 MG capsule Take 1 capsule by mouth daily.   1/28/2019 at Unknown time   • aspirin 81 MG tablet Take 81 mg by mouth Daily.   1/28/2019 at Unknown time   • carbidopa-levodopa-entacapone (STALEVO) 37.5-150-200 MG per tablet Take 1 tablet by mouth 3 (Three) Times a Day. 270 tablet 2 1/28/2019 at Unknown time   • Cyanocobalamin (VITAMIN B 12 PO) Take 1,000 mg by mouth Daily.   1/28/2019 at Unknown time   • finasteride (PROSCAR) 5 MG tablet TAKE ONE TABLET BY MOUTH DAILY 90 tablet 0 1/28/2019 at Unknown time   • Magnesium 400 MG capsule Take 1 capsule by mouth daily.   1/28/2019 at Unknown time   • melatonin tablet Take 6 mg by mouth Daily.   1/28/2019 at Unknown time   • Multiple Vitamins-Minerals (MULTIVITAMIN ADULT PO) Take 1 tablet by mouth Daily.   1/28/2019 at Unknown time   • rasagiline (AZILECT) 1 MG tablet Take 1 tablet by mouth Daily. 90 tablet 2 1/28/2019 at Unknown time   • rotigotine (NEUPRO) 4 MG/24HR patch Place 1 patch on the skin as directed by provider Daily. 90 patch 3 1/28/2019 at Unknown time   • tamsulosin (FLOMAX) 0.4 MG capsule 24 hr capsule TAKE ONE CAPSULE BY MOUTH DAILY 90 capsule 3 1/28/2019 at Unknown time   • Unable to find 1 each 1 (One) Time. Med Name: Protandum   1/28/2019 at Unknown time   • vitamin E 400 UNIT capsule Take 1 capsule by mouth daily.   1/28/2019 at Unknown time   • cephalexin (KEFLEX) 500 MG capsule Take 1 capsule by mouth 2 (Two) Times a Day. 14 capsule 0        Current Medications:   Current Facility-Administered Medications:   •  lidocaine PF 1% (XYLOCAINE) injection 0.1 mL, 0.1 mL, Intradermal, Once PRN, Mayito Rodriguez MD  •  sodium chloride 0.9 % flush 3 mL, 3 mL, Intravenous, Q12H, Mayito Rodriguez MD  •  sodium chloride 0.9 % flush 3-10 mL, 3-10 mL, Intravenous,  "PRN, Mayito Rodriguez MD  •  sodium chloride 0.9 % infusion, 75 mL/hr, Intravenous, Continuous, Mayito Rodriguez MD, Last Rate: 75 mL/hr at 01/29/19 0808, 75 mL/hr at 01/29/19 0808     Allergies: Patient has no known allergies.      Vital Signs   Temp:  [98.1 °F (36.7 °C)] 98.1 °F (36.7 °C)  Heart Rate:  [69] 69  Resp:  [18] 18  BP: (143)/(75) 143/75  Flowsheet Rows      First Filed Value   Admission Height  175.3 cm (69\") Documented at 01/29/2019 0755   Admission Weight  68 kg (150 lb) Documented at 01/29/2019 0755          General Appearance:    Alert, cooperative, in no acute distress   Head:    Normocephalic, without obvious abnormality, atraumatic       Neck:   No adenopathy, supple, no thyromegaly, no carotid bruit, no    JVD   Lungs:     Clear to auscultation bilaterally, no wheezes, rales, or     rhonchi    Heart:    Normal rate, regular rhythm,  No murmur, rub, or gallop   Chest Wall:    Mild erythema.  Serous drainage.     Abdomen:     Normal bowel sounds, soft, non-tender, non-distended,            no rebound tenderness   Extremities:   No cyanosis, clubbing, or edema   Pulses:   Pulses palpable and equal bilaterally   Skin:   No bleeding or rash       Neurologic:   Cranial nerves 2 - 12 grossly intact, sensation intact               Results Review: I personally viewed and interpreted the patient's EKG/Telemetry data            No results found for: TROPONINT        Assessment/Plan   1.  Superficial LINQ/skin infection    LINQ removal today    I discussed the patients findings and my recommendations with the patient            "

## 2019-02-04 ENCOUNTER — TELEPHONE (OUTPATIENT)
Dept: NEUROLOGY | Facility: CLINIC | Age: 69
End: 2019-02-04

## 2019-02-04 DIAGNOSIS — G20 PARKINSON'S DISEASE (HCC): Primary | ICD-10-CM

## 2019-02-04 NOTE — TELEPHONE ENCOUNTER
----- Message from Nirmala Perez sent at 2/4/2019  2:07 PM EST -----  Contact: [phone]  Patient would like to know if possible, could  put in referral for him to receive Speech Therapy at [facility].

## 2019-02-06 ENCOUNTER — OFFICE VISIT (OUTPATIENT)
Dept: CARDIOLOGY | Facility: CLINIC | Age: 69
End: 2019-02-06

## 2019-02-06 VITALS
HEIGHT: 69 IN | DIASTOLIC BLOOD PRESSURE: 52 MMHG | SYSTOLIC BLOOD PRESSURE: 82 MMHG | WEIGHT: 154 LBS | BODY MASS INDEX: 22.81 KG/M2 | HEART RATE: 61 BPM

## 2019-02-06 DIAGNOSIS — G45.9 TIA (TRANSIENT ISCHEMIC ATTACK): ICD-10-CM

## 2019-02-06 DIAGNOSIS — Z13.6 ENCOUNTER FOR SCREENING FOR VASCULAR DISEASE: Primary | ICD-10-CM

## 2019-02-06 DIAGNOSIS — E78.2 MIXED HYPERLIPIDEMIA: ICD-10-CM

## 2019-02-06 PROCEDURE — 93000 ELECTROCARDIOGRAM COMPLETE: CPT | Performed by: NURSE PRACTITIONER

## 2019-02-06 PROCEDURE — 99024 POSTOP FOLLOW-UP VISIT: CPT | Performed by: NURSE PRACTITIONER

## 2019-02-06 RX ORDER — ATORVASTATIN CALCIUM 40 MG/1
40 TABLET, FILM COATED ORAL NIGHTLY
Qty: 30 TABLET | Refills: 11 | Status: SHIPPED | OUTPATIENT
Start: 2019-02-06 | End: 2019-09-26

## 2019-02-06 NOTE — PROGRESS NOTES
"Date of Office Visit: 2019  Encounter Provider: RUSSELL Gabriel  Place of Service: [facility] CARDIOLOGY  Patient Name: Rei Silverman  :1950    Chief Complaint   Patient presents with   • Transient Ischemic Attack   • Follow-up   :     HPI: Rei Silverman is a 68 y.o. male is a patient of Dr. Claudio. I am seeing him today for the first time and have reviewed his record.     His past medical history is significant of probable TIA, Parkinson's, and hyperlipidemia.    He had issues with slurred and garbled speech in 2018 which lasted about 15-20 minutes and resolved.  This unfortunately recurred again and he was admitted and evaluated by the stroke team with a relatively negative workup. Echocardiogram was normal.  CT and MRI was negative. The neurologist in the hospital did not diagnose him with definitive TIA. He was continued on ASA at discharged and started on atorvastatin 80 mg. He then went to see his neurologist as outpatient who did suspected TIA.  He wore an extended monitor which ruled out atrial fibrillation.  He then had a Loop monitor inserted in 2018.  The site unfortunately was infected.  He completed antibiotic therapy but had that removed 2019.    Patient presents today for evaluation post- loop removal.  He denies chest pain tightness pressure, palpitation, shortness of breath, edema, near syncope, or syncope.  He denies any slurred speech, dysarthria, paralysis or other neurologic changes.  He has some occasional fatigue which he relates to Parkinson's.  He has some discomfort in the legs when she has had for years associated with his Parkinson's.  He tells me in December he was removed completely from atorvastatin 80 mg or myalgias which have \"somewhat \"improved. His wife is accompanying him today.      No Known Allergies    Past Medical History:   Diagnosis Date   • Cataract    • Colon polyps    • Essential tremor    • " "Hyperlipidemia    • Parkinson's disease (CMS/[facility])    • Septic joint of left elbow (CMS/HCC)     2011   • Stroke (CMS/[facility])    • TIA (transient ischemic attack)        Past Surgical History:   Procedure Laterality Date   • CARDIAC ELECTROPHYSIOLOGY PROCEDURE N/A 12/4/2018    Procedure: Loop insertion;  Surgeon: Mayito Rodriguez MD;  Location: [facility] INVASIVE LOCATION;  Service: Cardiovascular   • CARDIAC ELECTROPHYSIOLOGY PROCEDURE N/A 1/29/2019    Procedure: Loop recorder removal;  Surgeon: Mayito Rodriguez MD;  Location: [facility] CATH INVASIVE LOCATION;  Service: Cardiovascular   • COLONOSCOPY     • ELBOW DEBRIDEMENT     • NASAL SINUS SURGERY     • RETINAL DETACHMENT SURGERY           Family and social history reviewed.     Review of Systems   Constitution: Positive for malaise/fatigue.   Neurological: Positive for light-headedness.     All other systems were reviewed and are negative          Objective:     Vitals:    02/06/19 0933   BP: (!) 82/52   BP Location: Left arm   Patient Position: Sitting   Pulse: 61   Weight: 69.9 kg (154 lb)   Height: 175.3 cm (69\")     Body mass index is 22.74 kg/m².    PHYSICAL EXAM:  Physical Exam   Constitutional: He is oriented to person, place, and time. He appears well-developed and well-nourished. No distress.   HENT:   Head: Normocephalic.   Eyes: Conjunctivae are normal.   glasses   Neck: Normal range of motion. No JVD present.   Cardiovascular: Normal rate, regular rhythm, normal heart sounds and intact distal pulses.   No murmur heard.  Pulses:       Carotid pulses are 2+ on the right side, and 2+ on the left side.       Radial pulses are 2+ on the right side, and 2+ on the left side.        Posterior tibial pulses are 2+ on the right side, and 2+ on the left side.   Pulmonary/Chest: Effort normal and breath sounds normal. No respiratory distress. He has no wheezes. He has no rhonchi. He has no rales. He exhibits no tenderness.   Abdominal: Soft. Bowel sounds " are normal. He exhibits no distension.   Musculoskeletal: Normal range of motion. He exhibits no edema.   Neurological: He is alert and oriented to person, place, and time.   Skin: Skin is warm, dry and intact. No rash noted. He is not diaphoretic. No cyanosis.   Psychiatric: He has a normal mood and affect. His behavior is normal. Judgment and thought content normal.         ECG 12 Lead  Date/Time: 2/6/2019 9:57 AM  Performed by: Alessia Levin APRN  Authorized by: Alessia Levin APRN   Comparison: compared with previous ECG from 10/22/2018  Similar to previous ECG  Rhythm: sinus rhythm  Rate: normal  QRS axis: right  Clinical impression: abnormal ECG            Current Outpatient Medications   Medication Sig Dispense Refill   • Ascorbic Acid (VITAMIN C) 500 MG capsule Take 1 capsule by mouth daily.     • aspirin 81 MG tablet Take 81 mg by mouth Daily.     • carbidopa-levodopa-entacapone (STALEVO) 37.5-150-200 MG per tablet Take 1 tablet by mouth 3 (Three) Times a Day. 270 tablet 2   • Cyanocobalamin (VITAMIN B 12 PO) Take 1,000 mg by mouth Daily.     • finasteride (PROSCAR) 5 MG tablet TAKE ONE TABLET BY MOUTH DAILY 90 tablet 0   • melatonin tablet Take 6 mg by mouth Daily.     • Multiple Vitamins-Minerals (MULTIVITAMIN ADULT PO) Take 1 tablet by mouth Daily.     • rasagiline (AZILECT) 1 MG tablet Take 1 tablet by mouth Daily. 90 tablet 2   • rotigotine (NEUPRO) 4 MG/24HR patch Place 1 patch on the skin as directed by provider Daily. 90 patch 3   • tamsulosin (FLOMAX) 0.4 MG capsule 24 hr capsule TAKE ONE CAPSULE BY MOUTH DAILY 90 capsule 3   • vitamin E 400 UNIT capsule Take 1 capsule by mouth daily.     • atorvastatin (LIPITOR) 40 MG tablet Take 1 tablet by mouth Every Night. 30 tablet 11   • Magnesium 400 MG capsule Take 1 capsule by mouth daily.       No current facility-administered medications for this visit.      Assessment:       Diagnosis Plan   1. Encounter for screening for vascular disease  ECG 12 Lead     Vascular Screening   2. TIA (transient ischemic attack)     3. Mixed hyperlipidemia          Orders Placed This Encounter   Procedures   • ECG 12 Lead     This order was created via procedure documentation         Plan:         1. Probable TIA 08/2018 while on ASA 81 mg. he had a negative ZIO. Then loop insertion 12/18 then infection of that status post removal 01/2019. He is on ASA 81. Atorvastatin 80 was discontinued 12/2018 due to myalgias. He absolutely needs to be on a statin to decrease his risk and has agreed to try atorvastatin 40 mg.   2. Hyperlipidemia Goal LDL is 70 or less.  He did not tolerate atorvastatin 80 mg due to myalgias and has agreed to try 40 mg nightly he will call if he has issues with this.  3.  Status post loop recorder implantation then infection of site and removal in 01/2019 as listed above.  He will decide if he would like to proceed with having this reimplanted however, if he does not then medical therapy will continue without rhythm monitoring.  We have recommended the addition of Plavix 75 mg to ASA and statin if no device is chosen. Of course, if he had another neurologic event, re-implantation of loop would be required.  4. Parkinson's Disease followed by neurolgy  5.History of colon polyps  6. Screening for vascular disorder- patient has never had vascular screening and would like to have this done. I explained that the results this screening would not change the above plan/recommendation.      Plan of care reviewed with patient and wife. Dr. Claudio also came into the room and spoke with patient and wife.  Follow up in 6 weeks or call with questions/concerns.    It has been a pleasure to participate in this patient's care.      Thank you,  RUSSELL Gabriel      **Neftali Disclaimer:**  Much of this encounter note is an electronic transcription/translation of spoken language to printed text. The electronic translation of spoken language may permit erroneous, or at times,  nonsensical words or phrases to be inadvertently transcribed. Although I have reviewed the note for such errors, some may still exist.

## 2019-02-11 ENCOUNTER — TELEPHONE (OUTPATIENT)
Dept: CARDIOLOGY | Facility: CLINIC | Age: 69
End: 2019-02-11

## 2019-02-11 RX ORDER — CLOPIDOGREL BISULFATE 75 MG/1
75 TABLET ORAL DAILY
Qty: 90 TABLET | Refills: 3 | Status: SHIPPED | OUTPATIENT
Start: 2019-02-11 | End: 2019-09-26 | Stop reason: SDUPTHER

## 2019-02-11 NOTE — TELEPHONE ENCOUNTER
Plavix 75 mg has been sent to his local pharmacy. He is to take this in addition to all other medications including ASA 81 mg daily. Have him keep MI appt next month.       Thank you

## 2019-02-11 NOTE — TELEPHONE ENCOUNTER
Patient called to report he has decided to start on Plavix rather that reimplant a loop recorder per your note copied and pasted below.  Let me now if you would like me to send this in.  I was uncertain if you would need to talk with patient prior.  Thank you/ CAREN     Patient's phone number is [phone]

## 2019-02-14 ENCOUNTER — TELEPHONE (OUTPATIENT)
Dept: NEUROLOGY | Facility: CLINIC | Age: 69
End: 2019-02-14

## 2019-02-14 NOTE — TELEPHONE ENCOUNTER
----- Message from Shani Mckeon sent at 2/14/2019  2:02 PM EST -----  [phone]  DR COOPER SENT ORDERS FOR SPEECH AND THE SPEECH THERAPIST RECOMMENDS PHYSICAL THERAPY AND SO NOW THEY NEED ORDERS FOR THAT PLEASE.

## 2019-02-18 DIAGNOSIS — G20 PARKINSON'S DISEASE (HCC): Primary | ICD-10-CM

## 2019-03-12 DIAGNOSIS — N40.0 BENIGN NON-NODULAR PROSTATIC HYPERPLASIA WITHOUT LOWER URINARY TRACT SYMPTOMS: ICD-10-CM

## 2019-03-12 DIAGNOSIS — N40.0 BENIGN PROSTATIC HYPERPLASIA WITHOUT LOWER URINARY TRACT SYMPTOMS: ICD-10-CM

## 2019-03-12 RX ORDER — TAMSULOSIN HYDROCHLORIDE 0.4 MG/1
1 CAPSULE ORAL DAILY
Qty: 30 CAPSULE | Refills: 1 | Status: SHIPPED | OUTPATIENT
Start: 2019-03-12 | End: 2019-05-13 | Stop reason: SDUPTHER

## 2019-03-12 RX ORDER — FINASTERIDE 5 MG/1
5 TABLET, FILM COATED ORAL DAILY
Qty: 30 TABLET | Refills: 1 | Status: SHIPPED | OUTPATIENT
Start: 2019-03-12 | End: 2019-05-13 | Stop reason: SDUPTHER

## 2019-03-25 ENCOUNTER — OFFICE VISIT (OUTPATIENT)
Dept: CARDIOLOGY | Facility: CLINIC | Age: 69
End: 2019-03-25

## 2019-03-25 VITALS
HEIGHT: 68 IN | DIASTOLIC BLOOD PRESSURE: 72 MMHG | SYSTOLIC BLOOD PRESSURE: 110 MMHG | BODY MASS INDEX: 23.34 KG/M2 | HEART RATE: 62 BPM | WEIGHT: 154 LBS

## 2019-03-25 DIAGNOSIS — E78.2 MIXED HYPERLIPIDEMIA: Primary | ICD-10-CM

## 2019-03-25 DIAGNOSIS — I63.9 CRYPTOGENIC STROKE (HCC): ICD-10-CM

## 2019-03-25 PROCEDURE — 99213 OFFICE O/P EST LOW 20 MIN: CPT | Performed by: INTERNAL MEDICINE

## 2019-03-25 PROCEDURE — 93000 ELECTROCARDIOGRAM COMPLETE: CPT | Performed by: INTERNAL MEDICINE

## 2019-03-25 NOTE — PROGRESS NOTES
Date of Office Visit: 19  Encounter Provider: Emil Claudio MD  Place of Service: [facility] CARDIOLOGY  Patient Name: Rei Silverman  :1950  Referral Provider:No ref. provider found      No chief complaint on file.    History of Present Illness  Mr Silverman is a very pleasant 67yo gentleman with no prior history of heart disease but does have history of probable TIA, Parkinson's and hyperlipidemia.  His Parkinson's is been fairly stable but in 2018 he presented to [facility] emergency room after he had problems with very slurred garbled speech lasted about 15-20 minutes resolved and recurred again he was admitted to [facility] where he was seen by the stroke team his workup there was relatively negative in that he had a normal echocardiogram.  CTs and MRIs were negative and they felt that it was not definitely a TIA he did have a 2-D echocardiogram with Doppler that was normal.  He then saw his neurologist was concerned that he had a TIA.  Of note he had no episodes prior to that he's had no episodes since then.  Had a loop recorder implanted but unfortunately by 2 weeks after he got infected had to be removed.  Since then however he is doing great.  No chest pain or pressure.  No shortness of breath, orthopnea, or PND.  No palpitations, near-syncope or syncope.  No recurrent neurologic events.  He is walking 60 minutes around 4 days a week without problems.          Past Medical History:   Diagnosis Date   • Cataract    • Colon polyps    • Essential tremor    • Hyperlipidemia    • Parkinson's disease (CMS/[facility])    • Septic joint of left elbow (CMS/[facility])        • Stroke (CMS/[facility])    • TIA (transient ischemic attack)          Past Surgical History:   Procedure Laterality Date   • CARDIAC ELECTROPHYSIOLOGY PROCEDURE N/A 2018    Procedure: Loop insertion;  Surgeon: Mayito Rodriguez MD;  Location:  INVASIVE LOCATION;  Service: Cardiovascular    • CARDIAC ELECTROPHYSIOLOGY PROCEDURE N/A 1/29/2019    Procedure: Loop recorder removal;  Surgeon: Mayito Rodriguez MD;  Location: [facility] INVASIVE LOCATION;  Service: Cardiovascular   • COLONOSCOPY     • ELBOW DEBRIDEMENT     • NASAL SINUS SURGERY     • RETINAL DETACHMENT SURGERY           Current Outpatient Medications on File Prior to Visit   Medication Sig Dispense Refill   • Ascorbic Acid (VITAMIN C) 500 MG capsule Take 1 capsule by mouth daily.     • aspirin 81 MG tablet Take 81 mg by mouth Daily.     • atorvastatin (LIPITOR) 40 MG tablet Take 1 tablet by mouth Every Night. 30 tablet 11   • carbidopa-levodopa-entacapone (STALEVO) 37.5-150-200 MG per tablet Take 1 tablet by mouth 3 (Three) Times a Day. 270 tablet 2   • clopidogrel (PLAVIX) 75 MG tablet Take 1 tablet by mouth Daily. 90 tablet 3   • Cyanocobalamin (VITAMIN B 12 PO) Take 1,000 mg by mouth Daily.     • finasteride (PROSCAR) 5 MG tablet Take 1 tablet by mouth Daily. 30 tablet 1   • melatonin tablet Take 6 mg by mouth Daily.     • Multiple Vitamins-Minerals (MULTIVITAMIN ADULT PO) Take 1 tablet by mouth Daily.     • rasagiline (AZILECT) 1 MG tablet Take 1 tablet by mouth Daily. 90 tablet 2   • rotigotine (NEUPRO) 4 MG/24HR patch Place 1 patch on the skin as directed by provider Daily. 90 patch 3   • tamsulosin (FLOMAX) 0.4 MG capsule 24 hr capsule Take 1 capsule by mouth Daily. 30 capsule 1   • vitamin E 400 UNIT capsule Take 1 capsule by mouth daily.     • [DISCONTINUED] Magnesium 400 MG capsule Take 1 capsule by mouth daily.       No current facility-administered medications on file prior to visit.          Social History     Socioeconomic History   • Marital status:      Spouse name: Not on file   • Number of children: 2   • Years of education: Not on file   • Highest education level: Not on file   Occupational History   • Occupation: Retired   Tobacco Use   • Smoking status: Never Smoker   • Smokeless tobacco: Never Used   •  Tobacco comment: CAFFEINE USE   Substance and Sexual Activity   • Alcohol use: No     Comment: Former use   • Drug use: No     Comment: Former use   • Sexual activity: Defer   Lifestyle   • Physical activity:     Days per week: 4 days     Minutes per session: 60 min   • Stress: Not on file       Family History   Problem Relation Age of Onset   • Breast cancer Mother    • Hypertension Mother    • Coronary artery disease Father         MI   • Depression Father    • Diabetes Father    • Hypertension Father    • Heart attack Father    • Depression Sister          Review of Systems   Constitution: Negative for decreased appetite, diaphoresis, fever, weakness, malaise/fatigue, weight gain and weight loss.   HENT: Negative for congestion, hearing loss, nosebleeds and tinnitus.    Eyes: Negative for blurred vision, double vision, vision loss in left eye, vision loss in right eye and visual disturbance.   Cardiovascular:        As noted in HPI   Respiratory:        As noted HPI   Endocrine: Negative for cold intolerance and heat intolerance.   Hematologic/Lymphatic: Negative for bleeding problem. Does not bruise/bleed easily.   Skin: Negative for color change, flushing, itching and rash.   Musculoskeletal: Negative for arthritis, back pain, joint pain, joint swelling, muscle weakness and myalgias.   Gastrointestinal: Negative for bloating, abdominal pain, constipation, diarrhea, dysphagia, heartburn, hematemesis, hematochezia, melena, nausea and vomiting.   Genitourinary: Negative for bladder incontinence, dysuria, frequency, nocturia and urgency.   Neurological: Negative for dizziness, focal weakness, headaches, light-headedness, loss of balance, numbness, paresthesias and vertigo.   Psychiatric/Behavioral: Negative for depression, memory loss and substance abuse.       Procedures      ECG 12 Lead  Date/Time: 3/25/2019 1:15 PM  Performed by: Emil Claudio MD  Authorized by: Emil Claudio MD   Comparison: compared  "with previous ECG   Similar to previous ECG  Rhythm: sinus rhythm  Rate: normal  QRS axis: normal                  Objective:    /72 (BP Location: Right arm)   Pulse 62   Ht 172.7 cm (68\")   Wt 69.9 kg (154 lb)   BMI 23.42 kg/m²        Physical Exam  Physical Exam   Constitutional: He is oriented to person, place, and time. He appears well-developed and well-nourished. No distress.   HENT:   Head: Normocephalic.   Eyes: Conjunctivae are normal. Pupils are equal, round, and reactive to light. No scleral icterus.   Neck: Normal carotid pulses, no hepatojugular reflux and no JVD present. Carotid bruit is not present. No tracheal deviation, no edema and no erythema present. No thyromegaly present.   Cardiovascular: Normal rate, regular rhythm, S1 normal, S2 normal, normal heart sounds and intact distal pulses.  No extrasystoles are present. PMI is not displaced. Exam reveals no gallop, no distant heart sounds and no friction rub.   No murmur heard.  Pulses:       Carotid pulses are 2+ on the right side, and 2+ on the left side.       Radial pulses are 2+ on the right side, and 2+ on the left side.        Femoral pulses are 2+ on the right side, and 2+ on the left side.       Dorsalis pedis pulses are 2+ on the right side, and 2+ on the left side.        Posterior tibial pulses are 2+ on the right side, and 2+ on the left side.   Pulmonary/Chest: Effort normal and breath sounds normal. No respiratory distress. He has no decreased breath sounds. He has no wheezes. He has no rhonchi. He has no rales. He exhibits no tenderness.   Abdominal: Soft. Bowel sounds are normal. He exhibits no distension and no mass. There is no hepatosplenomegaly. There is no tenderness. There is no rebound and no guarding.   Musculoskeletal: He exhibits no edema, tenderness or deformity.   Neurological: He is alert and oriented to person, place, and time.   Skin: Skin is warm and dry. No rash noted. He is not diaphoretic. No cyanosis or " erythema. No pallor. Nails show no clubbing.   Psychiatric: He has a normal mood and affect. His speech is normal and behavior is normal. Judgment and thought content normal.           Assessment:   1.  68-year-old gentleman with neurologic symptoms possible cerebrovascular accident however not confirmed.  Status post implantable loop recorder but then infected had to be removed.  We discussed the he and his wife today whether to reimplant 1.  I am concerned about doing that as is he.  Has had no further symptoms.  It is not clear he had a TIA or stroke.  As a compromise he is going to get the ECG up for his phone check an ECG daily.  He will see our nurse practitioner in 6 months me in a year and call back if any problems.  2.  History of Parkinson's following with neurology.   3.  Hyperlipidemia now on target dose atorvastatin.  4.  History of colon polyps.          Plan:

## 2019-03-27 ENCOUNTER — TELEPHONE (OUTPATIENT)
Dept: NEUROLOGY | Facility: CLINIC | Age: 69
End: 2019-03-27

## 2019-03-27 NOTE — TELEPHONE ENCOUNTER
----- Message from Shani Mckeon sent at 3/26/2019  3:40 PM EDT -----  [facility] SEEING PATIENT AND WANTED TO SHARE SOME INFORMATION ABOUT HIS CONDITION:   CONCERNED ABOUT COGNITION AND MEMORY AND SOME SAFETY CONCERNS, DEFINITELY INCLUDING DRIVING, PCP DIAGNOSED MCI AND SHE FEELS IS MUCH MORE THAN MCI WHICH IS WHY SHE WANTED TO TALK TO DR COOPER. [phone]

## 2019-03-27 NOTE — TELEPHONE ENCOUNTER
CARLA FERREIRA    I returned a call to [facility]:  His memory is poor and definitely worse over the last 7 months.  She said it is of concrn for his driving.  His scores have declined.    He will return to  in April.  Mobilty is good.    He may need a decr of neupro, They think apokyn  May be too forward at this time.  Famly planning trip to [facility] next year.     They are still attempting cognitive therapy. One of the triggers of deterioration, was the stress of taking care of 4 grandchildren.  Now that this stress is better he is less fatigued and doing better. Wife is aware of all.    ISSUES to discuss in future- cognitive; reaction time; ?driving. Wife is doing most of the driving at this time anyway.

## 2019-04-15 ENCOUNTER — OFFICE VISIT (OUTPATIENT)
Dept: FAMILY MEDICINE CLINIC | Facility: CLINIC | Age: 69
End: 2019-04-15

## 2019-04-15 VITALS
WEIGHT: 151.8 LBS | DIASTOLIC BLOOD PRESSURE: 68 MMHG | BODY MASS INDEX: 23.01 KG/M2 | HEART RATE: 66 BPM | HEIGHT: 68 IN | OXYGEN SATURATION: 99 % | TEMPERATURE: 98.1 F | RESPIRATION RATE: 17 BRPM | SYSTOLIC BLOOD PRESSURE: 110 MMHG

## 2019-04-15 DIAGNOSIS — G20 PARKINSON'S DISEASE (HCC): ICD-10-CM

## 2019-04-15 DIAGNOSIS — R53.83 FATIGUE, UNSPECIFIED TYPE: Primary | ICD-10-CM

## 2019-04-15 PROBLEM — I63.9 CEREBROVASCULAR ACCIDENT (CVA) (HCC): Status: RESOLVED | Noted: 2019-01-28 | Resolved: 2019-04-15

## 2019-04-15 PROBLEM — I48.0 PAROXYSMAL ATRIAL FIBRILLATION (HCC): Status: RESOLVED | Noted: 2018-12-03 | Resolved: 2019-04-15

## 2019-04-15 PROCEDURE — 99214 OFFICE O/P EST MOD 30 MIN: CPT | Performed by: FAMILY MEDICINE

## 2019-04-15 RX ORDER — APOMORPHINE HYDROCHLORIDE 30 MG/3ML
INJECTION SUBCUTANEOUS
COMMUNITY
Start: 2019-03-18 | End: 2019-04-15 | Stop reason: ALTCHOICE

## 2019-04-15 RX ORDER — ZOSTER VACCINE RECOMBINANT, ADJUVANTED 50 MCG/0.5
KIT INTRAMUSCULAR
Refills: 1 | COMMUNITY
Start: 2019-04-09 | End: 2019-04-17

## 2019-04-15 NOTE — PROGRESS NOTES
"Chief Complaint   Patient presents with   • Establish Care     NP to Rebecca    • Fatigue       Subjective   Rei Silverman is a 68 y.o. male.     History of Present Illness   Fatigue  somedays he gets out of bed worn out. Not has often, one or two times per day. Just tries to get through it.   Not physical tiredness, weakness.  More of a mental fatigue, wondering about depression. Happens about 2-3 days per week. He does try to take a nap if he can some time between 1-3 will sleep for one hour. Nap does make him feel better, refreshed.  Drinking water a lot, does flavor with electrolytes. Once weekly he has protein shakes boost. He does eat three meals per day and snacks.   Some effect since last seen by Charan that his short term memory is not as sharp. He does PT and ST regularly. Working with speech and memory.   No change to bowel movements. No trouble with swallowing. Weight is stable.     Seen by Dr. Aas Villanueva for colonoscopy getting every 5 years. Goes to [place].  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   PD  Walking 4-5 days out of the week for one hour. He was water skiing last summer.     The following portions of the patient's history were reviewed and updated as appropriate: allergies, current medications, past family history, past medical history, past social history, past surgical history and problem list.    Review of Systems   Constitutional: Negative for activity change, appetite change and unexpected weight change.   Respiratory: Negative for shortness of breath.    Cardiovascular: Negative for chest pain and leg swelling.   Gastrointestinal: Negative for blood in stool, constipation and diarrhea.       Vitals:    04/15/19 1552   BP: 110/68   BP Location: Left arm   Patient Position: Sitting   Cuff Size: Adult   Pulse: 66   Resp: 17   Temp: 98.1 °F (36.7 °C)   TempSrc: Oral   SpO2: 99%   Weight: 68.9 kg (151 lb 12.8 oz)   Height: 172.7 cm (68\")       Objective "   Physical Exam   Constitutional: He is oriented to person, place, and time. He appears well-nourished. No distress.   HENT:   Right Ear: External ear normal.   Left Ear: External ear normal.   Nose: Nose normal.   Mouth/Throat: Oropharynx is clear and moist. No oropharyngeal exudate.   Bilateral ear canals and tympanic membranes appear normal.   Eyes: Conjunctivae and EOM are normal. Right eye exhibits no discharge. Left eye exhibits no discharge. No scleral icterus.   Neck: Neck supple. No thyromegaly present.   Cardiovascular: Normal rate, regular rhythm, normal heart sounds and intact distal pulses. Exam reveals no gallop and no friction rub.   No murmur heard.  Pulmonary/Chest: Effort normal and breath sounds normal. No respiratory distress. He has no wheezes. He has no rales. He exhibits no tenderness.   Abdominal: Soft. Bowel sounds are normal. He exhibits no distension and no mass. There is no tenderness. There is no guarding.   Musculoskeletal: He exhibits no edema or deformity.   Lymphadenopathy:     He has no cervical adenopathy.   Neurological: He is alert and oriented to person, place, and time. He exhibits normal muscle tone. Coordination normal.   Skin: Skin is warm and dry.   Psychiatric: He has a normal mood and affect. His behavior is normal.   Vitals reviewed.      Assessment/Plan   Rei was seen today for establish care and fatigue.    Diagnoses and all orders for this visit:    Fatigue, unspecified type  -     CBC & Differential  -     Comprehensive Metabolic Panel  -     Vitamin B12  -     TSH  -     T4    Parkinson's disease (CMS/HCC)      Pt is overall doing very well. He is fit and still very agile for having had PD for as long as he has. He is active and planning on water skiing again this summer. Does complain of fatigue. This was hard to tease out whether this is an emotional, mental or physical fatigue. He did deny SOB and weakness and feels as though it is more a mental fatigue though  he can tell himself to keep going. He also denies depressed mood. We will check in on labs given the vagueness of the fatigue. Could also be related to medications but says none of them are new. He is to let me know if he is getting worse or developing any new symptoms.

## 2019-04-16 LAB
ALBUMIN SERPL-MCNC: 4.5 G/DL (ref 3.5–5.2)
ALBUMIN/GLOB SERPL: 1.8 G/DL
ALP SERPL-CCNC: 75 U/L (ref 39–117)
ALT SERPL-CCNC: 17 U/L (ref 1–41)
AST SERPL-CCNC: 34 U/L (ref 1–40)
BASOPHILS # BLD AUTO: 0.05 10*3/MM3 (ref 0–0.2)
BASOPHILS NFR BLD AUTO: 1.1 % (ref 0–1.5)
BILIRUB SERPL-MCNC: 0.2 MG/DL (ref 0.2–1.2)
BUN SERPL-MCNC: 17 MG/DL (ref 8–23)
BUN/CREAT SERPL: 12.2 (ref 7–25)
CALCIUM SERPL-MCNC: 9.2 MG/DL (ref 8.6–10.5)
CHLORIDE SERPL-SCNC: 103 MMOL/L (ref 98–107)
CO2 SERPL-SCNC: 28 MMOL/L (ref 22–29)
CREAT SERPL-MCNC: 1.39 MG/DL (ref 0.76–1.27)
EOSINOPHIL # BLD AUTO: 0.11 10*3/MM3 (ref 0–0.4)
EOSINOPHIL NFR BLD AUTO: 2.3 % (ref 0.3–6.2)
ERYTHROCYTE [DISTWIDTH] IN BLOOD BY AUTOMATED COUNT: 11.9 % (ref 12.3–15.4)
GLOBULIN SER CALC-MCNC: 2.5 GM/DL
GLUCOSE SERPL-MCNC: 112 MG/DL (ref 65–99)
HCT VFR BLD AUTO: 40.7 % (ref 37.5–51)
HGB BLD-MCNC: 13.5 G/DL (ref 13–17.7)
IMM GRANULOCYTES # BLD AUTO: 0.01 10*3/MM3 (ref 0–0.05)
IMM GRANULOCYTES NFR BLD AUTO: 0.2 % (ref 0–0.5)
LYMPHOCYTES # BLD AUTO: 1.15 10*3/MM3 (ref 0.7–3.1)
LYMPHOCYTES NFR BLD AUTO: 24.2 % (ref 19.6–45.3)
MCH RBC QN AUTO: 33.2 PG (ref 26.6–33)
MCHC RBC AUTO-ENTMCNC: 33.2 G/DL (ref 31.5–35.7)
MCV RBC AUTO: 100 FL (ref 79–97)
MONOCYTES # BLD AUTO: 0.7 10*3/MM3 (ref 0.1–0.9)
MONOCYTES NFR BLD AUTO: 14.7 % (ref 5–12)
NEUTROPHILS # BLD AUTO: 2.74 10*3/MM3 (ref 1.4–7)
NEUTROPHILS NFR BLD AUTO: 57.5 % (ref 42.7–76)
NRBC BLD AUTO-RTO: 0 /100 WBC (ref 0–0)
PLATELET # BLD AUTO: 306 10*3/MM3 (ref 140–450)
POTASSIUM SERPL-SCNC: 4.1 MMOL/L (ref 3.5–5.2)
PROT SERPL-MCNC: 7 G/DL (ref 6–8.5)
RBC # BLD AUTO: 4.07 10*6/MM3 (ref 4.14–5.8)
SODIUM SERPL-SCNC: 142 MMOL/L (ref 136–145)
T4 SERPL-MCNC: 6.19 MCG/DL (ref 4.5–11.7)
TSH SERPL DL<=0.005 MIU/L-ACNC: 1.51 MIU/ML (ref 0.27–4.2)
VIT B12 SERPL-MCNC: >2000 PG/ML (ref 211–946)
WBC # BLD AUTO: 4.76 10*3/MM3 (ref 3.4–10.8)

## 2019-04-17 ENCOUNTER — OFFICE VISIT (OUTPATIENT)
Dept: NEUROLOGY | Facility: CLINIC | Age: 69
End: 2019-04-17

## 2019-04-17 VITALS
BODY MASS INDEX: 22.88 KG/M2 | HEART RATE: 70 BPM | HEIGHT: 68 IN | SYSTOLIC BLOOD PRESSURE: 108 MMHG | DIASTOLIC BLOOD PRESSURE: 75 MMHG | WEIGHT: 151 LBS

## 2019-04-17 DIAGNOSIS — G25.81 RESTLESS LEGS SYNDROME: Primary | ICD-10-CM

## 2019-04-17 DIAGNOSIS — R41.89 COGNITIVE CHANGES: ICD-10-CM

## 2019-04-17 DIAGNOSIS — G20 PARKINSON'S DISEASE (HCC): ICD-10-CM

## 2019-04-17 PROCEDURE — 99215 OFFICE O/P EST HI 40 MIN: CPT | Performed by: PSYCHIATRY & NEUROLOGY

## 2019-04-17 RX ORDER — RIVASTIGMINE TARTRATE 1.5 MG/1
1.5 CAPSULE ORAL 2 TIMES DAILY
Qty: 60 CAPSULE | Refills: 1 | Status: SHIPPED | OUTPATIENT
Start: 2019-04-17 | End: 2019-05-17 | Stop reason: SDUPTHER

## 2019-04-17 NOTE — PATIENT INSTRUCTIONS
Rivastigmine capsules  What is this medicine?  RIVASTIGMINE (ranulfo PATEL dao) is used to treat mild to moderate dementia caused by Alzheimer's disease or Parkinson's disease.  This medicine may be used for other purposes; ask your health care provider or pharmacist if you have questions.  COMMON BRAND NAME(S): Myriam  What should I tell my health care provider before I take this medicine?  They need to know if you have any of these conditions:  -difficulty passing urine  -heart disease, or irregular or slow heartbeat  -kidney disease  -liver disease  -lung or breathing disease, like asthma  -seizures  -stomach or intestine disease, ulcers, or stomach bleeding  -an unusual or allergic reaction to rivastigmine, other medicines, foods, dyes, or preservatives  -pregnant or trying to get pregnant  -breast-feeding  How should I use this medicine?  Take this medicine by mouth with a glass of water. Follow the directions on the prescription label. Take this medicine with food. Take your doses at regular intervals. Do not take your medicine more often than directed. Do not stop taking except on the advice of your doctor or health care professional.  Talk to your pediatrician regarding the use of this medicine in children. Special care may be needed.  Overdosage: If you think you have taken too much of this medicine contact a poison control center or emergency room at once.  NOTE: This medicine is only for you. Do not share this medicine with others.  What if I miss a dose?  If you miss a dose, take it as soon as you can. If it is almost time for your next dose, take only that dose. Do not take double or extra doses.  What may interact with this medicine?  -antihistamines for allergy, cough and cold  -atropine  -certain medicines for bladder problems like oxybutynin, tolterodine  -certain medicines for Parkinson's disease like benztropine, trihexyphenidyl  -certain medicines for stomach problems like dicyclomine,  hyoscyamine  -glycopyrrolate  -ipratropium  -certain medicines for travel sickness like scopolamine  -medicines that relax your muscles for surgery  -other medicines for Alzheimer's disease  This list may not describe all possible interactions. Give your health care provider a list of all the medicines, herbs, non-prescription drugs, or dietary supplements you use. Also tell them if you smoke, drink alcohol, or use illegal drugs. Some items may interact with your medicine.  What should I watch for while using this medicine?  Visit your doctor or health care professional for regular checks on your progress. Check with your doctor or health care professional if your symptoms do not get better or if they get worse.  You may get drowsy or dizzy. Do not drive, use machinery, or do anything that needs mental alertness until you know how this drug affects you.  What side effects may I notice from receiving this medicine?  Side effects that you should report to your doctor or health care professional as soon as possible:  -allergic reactions like skin rash, itching or hives, swelling of the face, lips, or tongue  -changes in vision or balance  -feeling faint or lightheaded, falls  -increase in frequency of passing urine, or incontinence  -nervousness, agitation, or increased confusion  -redness, blistering, peeling or loosening of the skin, including inside the mouth  -severe diarrhea  -slow heartbeat, or palpitations  -stomach pain  -sweating  -uncontrollable movements  -vomiting  -weight loss  Side effects that usually do not require medical attention (report to your doctor or health care professional if they continue or are bothersome):  -headache  -indigestion or heartburn  -loss of appetite  -mild diarrhea, especially when starting treatment  -nausea  This list may not describe all possible side effects. Call your doctor for medical advice about side effects. You may report side effects to FDA at 3-968-FDA-5700.  Where  should I keep my medicine?  Keep out of reach of children.  Store at room temperature between 15 and 30 degrees C (59 and 86 degrees F). Keep container tightly closed. Throw away any unused medicine after the expiration date.  NOTE: This sheet is a summary. It may not cover all possible information. If you have questions about this medicine, talk to your doctor, pharmacist, or health care provider.  © 2019 [employer] (2009-08-17 14:15:23)

## 2019-04-17 NOTE — PROGRESS NOTES
Subjective:     Patient ID: Rei Silverman is a 68 y.o. male.    History of Present Illness  The following portions of the patient's history were reviewed and updated as appropriate: allergies, current medications, past medical history, past social history, past surgical history and problem list.   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 He has had no falls.  Sleeping well  Other symptoms include micrographia but his activity is excellent.    He was referred to physical therapy to help reduce risk of falling in the future.  His motor skills remain pretty good.  No falls and very minimal if any tremor              MEDS:  Azilect 1.0+ Neupro 4.0+ Stalevo 150 3 times a day.       Cognitive changes-chronic abnormality from PD.           My review of speech-language pathology note from [facility] from June 20, 2018 1 the cognitive linguistic Quick test demonstrated borderline, moderate to severe, impairment in memory, executive function, and visual spatial skills.  Patient's language was only mildly impaired.  He had slow processing of information.  He had disorganized scanning and visual information.  There was poor self-monitoring during cognitive tasks.  Significantly reduced working memory was noted.  Overall executive function deficits are severely impairing patient's safety and problem solving.  He also reports difficulty with speech and voice.  However the cognitive communication deficit  treated first through speech therapy.      TODAY-cognitive problems were reviewed 3 weeks ago with Ms. Britton at the [facility] and his wife notes that the symptoms may have been worse due to stress of taking care of 4 grandchildren.  There are driving issues as well.  The last appointment he noted rare visual hallucinations.    Today I reviewed the Bridger cognitive assessment.  His score was 19 or 20  depending upon' trails'.  He said that he would be too stressed to do a neuropsychology test      MOOD-denied any significant problems with anxiety tearfulness depression etc.      TIA HISTORY in the past-he had a 20 min spell, spoke odd sounds, not words, then resolved. Repeated a second time. 20 min. No headache. Was admitted for 2 days  at [place] [address] .  CTA of H/N ok. Stroke team said possible PD symptoms!. Started on Lipitor 80mg/d then redcued to 40mg d/t mm pain..  Echo was normal with EF of 55%, but no KONRAD, no bubble study, .  LINQ data subsequently neg and is seeing cardiology. 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  He has had 2 scans the last in 2014.     Review of Systems   Constitutional: Positive for fatigue. Negative for activity change and appetite change.   HENT: Negative for ear pain, facial swelling and trouble swallowing.    Eyes: Positive for visual disturbance. Negative for photophobia and pain.   Respiratory: Negative for choking, chest tightness and shortness of breath.    Cardiovascular: Negative for chest pain, palpitations and leg swelling.   Gastrointestinal: Negative for abdominal pain, constipation and nausea.   Endocrine: Negative for polydipsia, polyphagia and polyuria.   Genitourinary: Negative for difficulty urinating, frequency and urgency.   Musculoskeletal: Negative for back pain, gait problem and neck pain.   Skin: Negative for color change, rash and wound.   Allergic/Immunologic: Negative for environmental allergies, food allergies and immunocompromised state.   Neurological: Negative for dizziness, tremors, seizures, syncope, facial asymmetry, speech difficulty, weakness, light-headedness, numbness and headaches.   Hematological: Negative for adenopathy. Does not bruise/bleed easily.   Psychiatric/Behavioral: Positive for confusion, decreased concentration and hallucinations. Negative  for agitation, behavioral problems, dysphoric mood, self-injury, sleep disturbance and suicidal ideas. The patient is not nervous/anxious and is not hyperactive.         Objective:    Neurologic Exam     Mental Status   Follows 2 step commands.   Attention: decreased. Concentration: decreased.   Speech: speech is normal   Abnormal comprehension.   Story cognitive assessment score was 20(possibly 19)    When I drew the clock correctly and asked them to place the hands for3:30 he was unable to do so     Cranial Nerves     CN II   Visual fields full to confrontation.     CN III, IV, VI   Pupils are equal, round, and reactive to light.  Extraocular motions are normal.   Right pupil: Size: 4 mm.   Left pupil: Size: 4 mm.     CN VII   Facial expression full, symmetric.     CN XI   CN XI normal.     CN XII   CN XII normal.     Motor Exam   Muscle bulk: normal  Right arm tone: cogwheel rigidity  Left arm tone: normal  Right arm pronator drift: absent  Left arm pronator drift: absent  Right leg tone: normal  Left leg tone: normalSlight rigidity of the right arm but no tremor     Sensory Exam   Light touch normal.     Gait, Coordination, and Reflexes     Gait  Gait: normal    Coordination   Romberg: negative  Finger to nose coordination: normal    Tremor   Resting tremor: absent  Intention tremor: absent  Action tremor: absent    Reflexes   Right biceps: 1+  Left biceps: 1+  Right triceps: 1+  Left triceps: 1+No shuffle.  Her worries a little.  Turns quickly       Physical Exam   Constitutional: He appears well-developed and well-nourished.   Eyes: EOM are normal. Pupils are equal, round, and reactive to light.   Neck: Normal range of motion. Neck supple.   Cardiovascular: Normal rate.   Pulmonary/Chest: Effort normal.   Neurological: He has a normal Finger-Nose-Finger Test and a normal Romberg Test. Gait normal.   Reflex Scores:       Tricep reflexes are 1+ on the right side and 1+ on the left side.       Bicep reflexes  are 1+ on the right side and 1+ on the left side.  Psychiatric: He has a normal mood and affect. His speech is normal.   Nursing note and vitals reviewed.      Assessment/Plan:  I spent 75 minutes with this patient today with greater than 50% of the time coordinating care as follows:    Parkinson's cognitive symptoms-we will try to improve his cognition by reducing some medication and adding Frey stick mean.  The side effects of all this was discussed.    He will reduce the Neupro patch by alternating 2 mg and 4 mg every other day for 10 days then decreasing to 2 mg every day, watching for motor worsening and discussing with physical therapy as well.    At the same time he will begin exelon 1.5 mg twice daily with food especially with fatty food, and then call in 4 weeks to increase to the 3 mg size    He could not go through neuropsychology testing as noted above but the mitral test was abnormal.    In the future if there is no improvement we will reduce Azilect    We could also consider Rytary but he has no wearing off or dyskinesia at this time    He will have his driving assessed at [facility] rehab     Problems Addressed this Visit        Unprioritized    Parkinson's disease (CMS/HCC)    Relevant Medications    rotigotine (NEUPRO) 2 MG/24HR patch    Restless legs syndrome - Primary    Cognitive changes

## 2019-04-19 ENCOUNTER — TELEPHONE (OUTPATIENT)
Dept: NEUROLOGY | Facility: CLINIC | Age: 69
End: 2019-04-19

## 2019-04-19 DIAGNOSIS — R79.89 ELEVATED SERUM CREATININE: Primary | ICD-10-CM

## 2019-04-19 NOTE — TELEPHONE ENCOUNTER
Mrs. Silverman called about the Neuropro patch 2 MG and it is going to cost over $500 Need to call insurnace to see if it because the 4 mg was just filled.

## 2019-04-30 ENCOUNTER — TREATMENT (OUTPATIENT)
Dept: NEUROLOGY | Facility: CLINIC | Age: 69
End: 2019-04-30

## 2019-04-30 ENCOUNTER — TELEPHONE (OUTPATIENT)
Dept: NEUROLOGY | Facility: CLINIC | Age: 69
End: 2019-04-30

## 2019-04-30 NOTE — TELEPHONE ENCOUNTER
----- Message from Cecilia Beasley sent at 4/26/2019  9:12 AM EDT -----  Contact: [phone] ASHER WIFE  Pt's wife said she was looking for an update on the change and insurance and etc? If you could call her? Please advise.

## 2019-04-30 NOTE — PROGRESS NOTES
Insurance would not cover 2 mg patch, so still on 4mg patch.  I recommended cutting it in half to 2mg size.    Rivastigmine 1.5 bid. No nausea. Consider patch in future.     Rytary- has been approved. Cost issue was discussed with the company, and should get it cove  The long-term plan will be to reduce the patch to 2 mg  And increase the Frey stick mean to 3 mg twice daily, and see how his mentation turns out.  If he needs more help with Parkinson symptoms then switch stay levo to Rytary

## 2019-05-02 ENCOUNTER — TELEPHONE (OUTPATIENT)
Dept: NEUROLOGY | Facility: CLINIC | Age: 69
End: 2019-05-02

## 2019-05-02 NOTE — TELEPHONE ENCOUNTER
Medication was approved.    ----- Message from Shani Mckeon sent at 4/30/2019  8:53 AM EDT -----  Yvon requesting a PA for [facility] [phone] ?

## 2019-05-08 DIAGNOSIS — N40.0 BENIGN PROSTATIC HYPERPLASIA WITHOUT LOWER URINARY TRACT SYMPTOMS: ICD-10-CM

## 2019-05-08 RX ORDER — TAMSULOSIN HYDROCHLORIDE 0.4 MG/1
CAPSULE ORAL
Qty: 30 CAPSULE | Refills: 0 | OUTPATIENT
Start: 2019-05-08

## 2019-05-10 ENCOUNTER — TRANSCRIBE ORDERS (OUTPATIENT)
Dept: ADMINISTRATIVE | Facility: HOSPITAL | Age: 69
End: 2019-05-10

## 2019-05-10 DIAGNOSIS — Z13.9 SCREENING FOR CONDITION: Primary | ICD-10-CM

## 2019-05-13 ENCOUNTER — HOSPITAL ENCOUNTER (OUTPATIENT)
Dept: CARDIOLOGY | Facility: HOSPITAL | Age: 69
Discharge: HOME | End: 2019-05-13
Admitting: INTERNAL MEDICINE

## 2019-05-13 VITALS
HEART RATE: 53 BPM | SYSTOLIC BLOOD PRESSURE: 126 MMHG | BODY MASS INDEX: 23.39 KG/M2 | WEIGHT: 149 LBS | DIASTOLIC BLOOD PRESSURE: 72 MMHG | HEIGHT: 67 IN

## 2019-05-13 DIAGNOSIS — N40.0 BENIGN NON-NODULAR PROSTATIC HYPERPLASIA WITHOUT LOWER URINARY TRACT SYMPTOMS: ICD-10-CM

## 2019-05-13 DIAGNOSIS — N40.0 BENIGN PROSTATIC HYPERPLASIA WITHOUT LOWER URINARY TRACT SYMPTOMS: ICD-10-CM

## 2019-05-13 DIAGNOSIS — Z13.9 SCREENING FOR CONDITION: ICD-10-CM

## 2019-05-13 LAB
BH CV ECHO MEAS - DIST AO DIAM: 1.43 CM
BH CV VAS BP LEFT ARM: NORMAL MMHG
BH CV VAS BP RIGHT ARM: NORMAL MMHG
BH CV XLRA MEAS - MID AO DIAM: 1.59 CM
BH CV XLRA MEAS - PAD LEFT ABI DP: 1.19
BH CV XLRA MEAS - PAD LEFT ABI PT: 1.19
BH CV XLRA MEAS - PAD LEFT ARM: 126 MMHG
BH CV XLRA MEAS - PAD LEFT LEG DP: 150 MMHG
BH CV XLRA MEAS - PAD LEFT LEG PT: 150 MMHG
BH CV XLRA MEAS - PAD RIGHT ABI DP: 1.22
BH CV XLRA MEAS - PAD RIGHT ABI PT: 1.23
BH CV XLRA MEAS - PAD RIGHT ARM: 124 MMHG
BH CV XLRA MEAS - PAD RIGHT LEG DP: 154 MMHG
BH CV XLRA MEAS - PAD RIGHT LEG PT: 156 MMHG
BH CV XLRA MEAS - PROX AO DIAM: 2.07 CM
BH CV XLRA MEAS LEFT ICA/CCA RATIO: 1.07
BH CV XLRA MEAS LEFT MID CCA PSV: NORMAL CM/SEC
BH CV XLRA MEAS LEFT MID ICA PSV: NORMAL CM/SEC
BH CV XLRA MEAS LEFT PROX ECA PSV: NORMAL CM/SEC
BH CV XLRA MEAS RIGHT ICA/CCA RATIO: 1.16
BH CV XLRA MEAS RIGHT MID CCA PSV: NORMAL CM/SEC
BH CV XLRA MEAS RIGHT MID ICA PSV: NORMAL CM/SEC
BH CV XLRA MEAS RIGHT PROX ECA PSV: NORMAL CM/SEC

## 2019-05-13 PROCEDURE — 93799 UNLISTED CV SVC/PROCEDURE: CPT

## 2019-05-13 RX ORDER — FINASTERIDE 5 MG/1
TABLET, FILM COATED ORAL
Qty: 30 TABLET | Refills: 0 | Status: SHIPPED | OUTPATIENT
Start: 2019-05-13 | End: 2019-06-13 | Stop reason: SDUPTHER

## 2019-05-13 RX ORDER — TAMSULOSIN HYDROCHLORIDE 0.4 MG/1
CAPSULE ORAL
Qty: 30 CAPSULE | Refills: 0 | Status: SHIPPED | OUTPATIENT
Start: 2019-05-13 | End: 2019-06-13 | Stop reason: SDUPTHER

## 2019-05-16 RX ORDER — RIVASTIGMINE TARTRATE 1.5 MG/1
1.5 CAPSULE ORAL 2 TIMES DAILY
Qty: 60 CAPSULE | Refills: 1 | Status: CANCELLED | OUTPATIENT
Start: 2019-05-16

## 2019-05-16 NOTE — TELEPHONE ENCOUNTER
----- Message from Shani Mckeon sent at 5/16/2019  9:05 AM EDT -----  Herminia, wife, called to let Dr. Farrar know patient needs refill on exelon but for 3 mg instead of 1.5 mg sent to [facility]. He also wanted to let him know he has been cutting Neupro pathch in half and has had no skin irritation. There is a week left of alternating between the 4mg and 2 mg. She said he has had no problems and is doing well. [phone]

## 2019-05-17 RX ORDER — RIVASTIGMINE TARTRATE 3 MG/1
3 CAPSULE ORAL 2 TIMES DAILY
Qty: 60 CAPSULE | Refills: 5 | Status: SHIPPED | OUTPATIENT
Start: 2019-05-17 | End: 2019-05-20

## 2019-05-20 RX ORDER — RIVASTIGMINE TARTRATE 3 MG/1
3 CAPSULE ORAL 2 TIMES DAILY
Qty: 60 CAPSULE | Refills: 3 | Status: SHIPPED | OUTPATIENT
Start: 2019-05-20 | End: 2019-06-03

## 2019-05-21 ENCOUNTER — TELEPHONE (OUTPATIENT)
Dept: CARDIOLOGY | Facility: CLINIC | Age: 69
End: 2019-05-21

## 2019-05-21 NOTE — TELEPHONE ENCOUNTER
Shanique called, informing me that Delmar did get the vascular screening.  Shanique would like to know the results and if he can come off of Lipitor or Plavix.  Please advise. Yary

## 2019-06-03 ENCOUNTER — TELEPHONE (OUTPATIENT)
Dept: NEUROLOGY | Facility: CLINIC | Age: 69
End: 2019-06-03

## 2019-06-03 RX ORDER — RIVASTIGMINE 4.6 MG/24H
PATCH, EXTENDED RELEASE TRANSDERMAL
Qty: 30 PATCH | Refills: 2 | Status: SHIPPED | OUTPATIENT
Start: 2019-06-03 | End: 2019-06-10 | Stop reason: SDUPTHER

## 2019-06-03 NOTE — TELEPHONE ENCOUNTER
I called patient and wife.  They are going to [place] for a week.       EXELON:  Exelon 3 mg bid caused nausea and dizziness. [He started last week, and progrssively worse each day.].  While they are in [place] I recommended only taking 3 mg at nighttime when they return to [place] I would like for them to  the Exelon patch 4.6 mg    If it is generic and they can afford it.  Otherwise we will try Aricept.              Neupro patch 2 mg [half of a 4mg patch]  Works OK.

## 2019-06-03 NOTE — TELEPHONE ENCOUNTER
----- Message from Shani Mckeon sent at 6/3/2019 11:29 AM EDT -----  Rosa, patient's wife, called to let Dr. Farrar know they've had trouble with the Exelon. After it was increased to 3mg, she says he was having nausea, shakes, over all feeling ill and the patch that they were cutting in half, he had a terrible night the first night they cut it in half and put him back on a full patch because of all the issues. Her phone number is [phone]

## 2019-06-08 DIAGNOSIS — N40.0 BENIGN PROSTATIC HYPERPLASIA WITHOUT LOWER URINARY TRACT SYMPTOMS: ICD-10-CM

## 2019-06-10 ENCOUNTER — TELEPHONE (OUTPATIENT)
Dept: NEUROLOGY | Facility: CLINIC | Age: 69
End: 2019-06-10

## 2019-06-10 RX ORDER — TAMSULOSIN HYDROCHLORIDE 0.4 MG/1
CAPSULE ORAL
Qty: 30 CAPSULE | Refills: 0 | OUTPATIENT
Start: 2019-06-10

## 2019-06-10 RX ORDER — RIVASTIGMINE 4.6 MG/24H
PATCH, EXTENDED RELEASE TRANSDERMAL
Qty: 30 PATCH | Refills: 0 | Status: SHIPPED | OUTPATIENT
Start: 2019-06-10 | End: 2019-07-09 | Stop reason: SDUPTHER

## 2019-06-10 NOTE — TELEPHONE ENCOUNTER
As discussed I definitely believe the patches will be better.  The capsule only lasts about 12 hours.    The dose of the patch is 4.6 mg daily for 1 month.  The point I will usually increase to 9.5 mg patch depending upon how the patient has done on the smaller size.

## 2019-06-10 NOTE — TELEPHONE ENCOUNTER
Mrs. Silverman , called and said they are going to get the exelon patch and try it for a month but she wants to know should they finish the capsules first.  He has been tolerating the Capsules one at night. He does not seem to be getting any worse with his memory and no side effects.    The patch through [employer] will be about $125 a month   The Capsule it around $40 for a 90 day supply. They are willing to get the patches if you think it would be better.

## 2019-06-11 DIAGNOSIS — N40.0 BENIGN NON-NODULAR PROSTATIC HYPERPLASIA WITHOUT LOWER URINARY TRACT SYMPTOMS: ICD-10-CM

## 2019-06-11 RX ORDER — FINASTERIDE 5 MG/1
TABLET, FILM COATED ORAL
Qty: 30 TABLET | Refills: 0 | OUTPATIENT
Start: 2019-06-11

## 2019-06-12 DIAGNOSIS — N40.0 BENIGN NON-NODULAR PROSTATIC HYPERPLASIA WITHOUT LOWER URINARY TRACT SYMPTOMS: ICD-10-CM

## 2019-06-12 DIAGNOSIS — N40.0 BENIGN PROSTATIC HYPERPLASIA WITHOUT LOWER URINARY TRACT SYMPTOMS: ICD-10-CM

## 2019-06-13 DIAGNOSIS — N40.0 BENIGN PROSTATIC HYPERPLASIA WITHOUT LOWER URINARY TRACT SYMPTOMS: ICD-10-CM

## 2019-06-13 DIAGNOSIS — N40.0 BENIGN NON-NODULAR PROSTATIC HYPERPLASIA WITHOUT LOWER URINARY TRACT SYMPTOMS: ICD-10-CM

## 2019-06-13 RX ORDER — FINASTERIDE 5 MG/1
TABLET, FILM COATED ORAL
Qty: 30 TABLET | Refills: 0 | OUTPATIENT
Start: 2019-06-13

## 2019-06-13 RX ORDER — FINASTERIDE 5 MG/1
5 TABLET, FILM COATED ORAL DAILY
Qty: 90 TABLET | Refills: 0 | Status: SHIPPED | OUTPATIENT
Start: 2019-06-13 | End: 2019-09-19 | Stop reason: SDUPTHER

## 2019-06-13 RX ORDER — TAMSULOSIN HYDROCHLORIDE 0.4 MG/1
1 CAPSULE ORAL DAILY
Qty: 90 CAPSULE | Refills: 0 | Status: SHIPPED | OUTPATIENT
Start: 2019-06-13 | End: 2019-09-08 | Stop reason: SDUPTHER

## 2019-06-13 RX ORDER — TAMSULOSIN HYDROCHLORIDE 0.4 MG/1
CAPSULE ORAL
Qty: 30 CAPSULE | Refills: 0 | OUTPATIENT
Start: 2019-06-13

## 2019-06-24 ENCOUNTER — TELEPHONE (OUTPATIENT)
Dept: NEUROLOGY | Facility: CLINIC | Age: 69
End: 2019-06-24

## 2019-06-24 NOTE — TELEPHONE ENCOUNTER
Pt and spouse called wanting to know if you want to start Rytary and if you want to increase the patch. They also stated that Mr. Silverman is doing fine right now.

## 2019-07-09 ENCOUNTER — TELEPHONE (OUTPATIENT)
Dept: NEUROLOGY | Facility: CLINIC | Age: 69
End: 2019-07-09

## 2019-07-09 DIAGNOSIS — G20 PARKINSON'S DISEASE (HCC): Primary | ICD-10-CM

## 2019-07-09 RX ORDER — RIVASTIGMINE 9.5 MG/24H
1 PATCH, EXTENDED RELEASE TRANSDERMAL DAILY
Qty: 30 PATCH | Refills: 6 | Status: SHIPPED | OUTPATIENT
Start: 2019-07-09 | End: 2019-07-10 | Stop reason: SDUPTHER

## 2019-07-09 RX ORDER — RIVASTIGMINE 4.6 MG/24H
PATCH, EXTENDED RELEASE TRANSDERMAL
Qty: 30 PATCH | Refills: 2 | Status: SHIPPED | OUTPATIENT
Start: 2019-07-09 | End: 2019-07-09 | Stop reason: DRUGHIGH

## 2019-07-09 NOTE — TELEPHONE ENCOUNTER
I called and left a message that it is time to increase Exelon patch from 4.6 mg to 9.5 mg and I will send it in to the pharmacy.    Watch for weight loss or nausea.  If the believe he is doing perfect on the 4.6 then call back and I will alter the prescription to the previous dose

## 2019-07-09 NOTE — TELEPHONE ENCOUNTER
Patient was last seen on, 06-    Patient has an appointment to be seen on, 08-    Please advise.  Thanks!

## 2019-07-10 ENCOUNTER — TELEPHONE (OUTPATIENT)
Dept: NEUROLOGY | Facility: CLINIC | Age: 69
End: 2019-07-10

## 2019-07-10 RX ORDER — RIVASTIGMINE 4.6 MG/24H
PATCH, EXTENDED RELEASE TRANSDERMAL
Qty: 30 PATCH | Refills: 3 | OUTPATIENT
Start: 2019-07-10

## 2019-07-10 RX ORDER — RIVASTIGMINE 9.5 MG/24H
1 PATCH, EXTENDED RELEASE TRANSDERMAL DAILY
Qty: 30 PATCH | Refills: 6 | Status: SHIPPED | OUTPATIENT
Start: 2019-07-10 | End: 2019-12-06 | Stop reason: SDUPTHER

## 2019-07-10 NOTE — TELEPHONE ENCOUNTER
I took of this I also spoke to the patient      ----- Message from Nirmala ePrez sent at 7/10/2019  9:40 AM EDT -----  Contact: [phone]  PATIENT NEEDS MEDICATION (RIVASTIGMINE) SENT TO [facility] #[id number] - [place], [place] - [address] - [phone]  - [phone] FX. THEY WERE CALLED INTO [facility] PATIENT NO LONGER USES THAT PHARMACY, I'VE DELETED IT OUT OF HIS CHART.

## 2019-07-24 ENCOUNTER — TELEPHONE (OUTPATIENT)
Dept: NEUROLOGY | Facility: CLINIC | Age: 69
End: 2019-07-24

## 2019-07-24 NOTE — TELEPHONE ENCOUNTER
----- Message from Nirmala Perez sent at 7/23/2019 10:12 AM EDT -----  Contact: [phone]  Patient needs a refill on his Neupro Patch. He is using [facility] [address] [phone]

## 2019-08-13 ENCOUNTER — OFFICE VISIT (OUTPATIENT)
Dept: NEUROLOGY | Facility: CLINIC | Age: 69
End: 2019-08-13

## 2019-08-13 VITALS
BODY MASS INDEX: 23.78 KG/M2 | HEART RATE: 88 BPM | HEIGHT: 66 IN | SYSTOLIC BLOOD PRESSURE: 118 MMHG | DIASTOLIC BLOOD PRESSURE: 78 MMHG | WEIGHT: 148 LBS | OXYGEN SATURATION: 98 %

## 2019-08-13 DIAGNOSIS — Q04.9 CORTICAL DYSPLASIA (HCC): ICD-10-CM

## 2019-08-13 DIAGNOSIS — G20 PARKINSON'S DISEASE (HCC): ICD-10-CM

## 2019-08-13 DIAGNOSIS — R41.89 COGNITIVE CHANGES: Primary | ICD-10-CM

## 2019-08-13 DIAGNOSIS — I63.10 CEREBROVASCULAR ACCIDENT (CVA) DUE TO EMBOLISM OF PRECEREBRAL ARTERY (HCC): ICD-10-CM

## 2019-08-13 PROCEDURE — 99214 OFFICE O/P EST MOD 30 MIN: CPT | Performed by: PSYCHIATRY & NEUROLOGY

## 2019-08-13 NOTE — PROGRESS NOTES
Subjective:     Patient ID: Rei Silverman is a 68 y.o. male.    History of Present Illness  The following portions of the patient's history were reviewed and updated as appropriate: allergies, current medications, past family history, past medical history, past social history, past surgical history and problem list.   Parkinson's disease, right marine-parkinsonism, onset January 2012, symptoms include right-sided tremor and some occasional difficulties with word finding. He has had no falls.  Sleeping well  Other symptoms include micrographia but his activity is excellent.    He was referred to physical therapy to help reduce risk of falling in the future.  His motor skills remain pretty good.  No falls and very minimal if any tremor.  His wife noticed market improvement on Exelon patch and reducing Neupro from 4 mg to 2 mg at last office visit           MEDS:  Azilect 1.0+ Neupro 2.0+ Stalevo 150 3 times a day.    Cognitive changes-chronic abnormality from PD.           My review of speech-language pathology note from [facility] from June 20, 2018 1 the cognitive linguistic Quick test demonstrated borderline, moderate to severe, impairment in memory, executive function, and visual spatial skills.  Mentation: improved/stabilized on E Patch, generic.  The dose is 9.5 mg daily.  He scored 20 out of 30 on the Bridger cognitive test score 4 months ago.  He was under stress    Chronic ABNORMAL MRI BRAIN- has focal cortical dysplasia.  He has had 2 scans the last in 2014.  This has been worked up extensively since 2012     TIA HISTORY in the past.  All notes and findings reviewed from April 17, 2019 office visit.  He is stable on Plavix plus statin  Review of Systems   Constitutional: Positive for fatigue. Negative for activity change and appetite change.   HENT: Negative for ear pain, facial swelling and trouble swallowing.    Eyes: Negative for photophobia and pain.   Respiratory: Negative for choking, chest tightness  and shortness of breath.    Cardiovascular: Negative for chest pain, palpitations and leg swelling.   Gastrointestinal: Negative for abdominal pain, constipation and nausea.   Endocrine: Negative for polydipsia, polyphagia and polyuria.   Genitourinary: Negative for difficulty urinating, frequency and urgency.   Musculoskeletal: Negative for back pain, gait problem and neck pain.   Skin: Negative for color change, rash and wound.   Allergic/Immunologic: Negative for environmental allergies, food allergies and immunocompromised state.   Neurological: Positive for tremors. Negative for dizziness, seizures, syncope, facial asymmetry, speech difficulty, weakness, light-headedness, numbness and headaches.   Hematological: Negative for adenopathy. Bruises/bleeds easily.   Psychiatric/Behavioral: Positive for confusion. Negative for agitation, behavioral problems, decreased concentration, dysphoric mood, hallucinations, self-injury, sleep disturbance (Dreams and sometimes trashes in his sleep per his wife.) and suicidal ideas. The patient is not nervous/anxious and is not hyperactive.         Objective:  Review of systems noted    Neurologic Exam      Mental Status   Follows 2 step commands.   Attention: decreased. Concentration: decreased.   Speech: speech is normal   Abnormal comprehension.        Cranial Nerves      CN II   Visual fields full to confrontation.      CN III, IV, VI   Pupils are equal, round, and reactive to light.  Extraocular motions are normal.   Right pupil: Size: 4 mm.   Left pupil: Size: 4 mm.      CN VII   Facial expression full, symmetric.      CN XI   CN XI normal.      CN XII   CN XII normal.      Motor Exam   Muscle bulk: normal  Right arm tone: cogwheel rigidity  Left arm tone: normal  Right arm pronator drift: absent  Left arm pronator drift: absent  Right leg tone: normal  Left leg tone: normalSlight rigidity of the right arm but no tremor      Sensory Exam   Light touch normal.      Gait,  Coordination, and Reflexes      Gait  Gait: normal     Coordination   Romberg: negative  Finger to nose coordination: normal     Tremor   Resting tremor: absent  Intention tremor: absent  Action tremor: absent     Reflexes   Right biceps: 1+  Left biceps: 1+  Right triceps: 1+  Left triceps: 1+No shuffle.   Turns quickly         Physical Exam   Constitutional: He appears well-developed and well-nourished.   Eyes: EOM are normal. Pupils are equal, round, and reactive to light.   Neck: Normal range of motion. Neck supple.   Cardiovascular: Normal rate.   Pulmonary/Chest: Effort normal.     Psychiatric: He has a normal mood and affect. His speech is normal.   Nursing note and vitals reviewed.         Assessment/Plan:       Problems Addressed this Visit        Unprioritized    Cortical dysplasia (CMS/HCC)    Parkinson's disease (CMS/HCC)    Cerebrovascular accident (CVA) due to embolism of precerebral artery (CMS/HCC)    Cognitive changes - Primary           All problems are stable and cognition is improved!    No medicine changes are warranted today with triple medicine therapy for Parkinson's disease and cognitive changes associated with Parkinson's disease    Long-term plan will be to possibly increase stay levo to 200 3 times daily if needed or switch to Rytary in the future.  If cognitive changes worsen I would discontinue the Neupro patch.  His weight is stable but if it deteriorates I would reduce or discontinue Azilect.    Azilect seems to have had a good effect.  He has had Parkinson's disease since 2012.  Over the 6 years he has very few motor findings and I think Azilect has helped reduce the progression of the illness

## 2019-09-08 DIAGNOSIS — N40.0 BENIGN PROSTATIC HYPERPLASIA WITHOUT LOWER URINARY TRACT SYMPTOMS: ICD-10-CM

## 2019-09-10 RX ORDER — TAMSULOSIN HYDROCHLORIDE 0.4 MG/1
CAPSULE ORAL
Qty: 15 CAPSULE | Refills: 0 | Status: SHIPPED | OUTPATIENT
Start: 2019-09-10 | End: 2019-09-19 | Stop reason: SDUPTHER

## 2019-09-11 DIAGNOSIS — N40.0 BENIGN PROSTATIC HYPERPLASIA WITHOUT LOWER URINARY TRACT SYMPTOMS: ICD-10-CM

## 2019-09-11 RX ORDER — TAMSULOSIN HYDROCHLORIDE 0.4 MG/1
CAPSULE ORAL
Qty: 90 CAPSULE | Refills: 0 | OUTPATIENT
Start: 2019-09-11

## 2019-09-13 DIAGNOSIS — N40.0 BENIGN NON-NODULAR PROSTATIC HYPERPLASIA WITHOUT LOWER URINARY TRACT SYMPTOMS: ICD-10-CM

## 2019-09-13 RX ORDER — FINASTERIDE 5 MG/1
TABLET, FILM COATED ORAL
Qty: 30 TABLET | Refills: 0 | OUTPATIENT
Start: 2019-09-13

## 2019-09-16 DIAGNOSIS — N40.0 BENIGN PROSTATIC HYPERPLASIA WITHOUT LOWER URINARY TRACT SYMPTOMS: ICD-10-CM

## 2019-09-16 DIAGNOSIS — N40.0 BENIGN NON-NODULAR PROSTATIC HYPERPLASIA WITHOUT LOWER URINARY TRACT SYMPTOMS: ICD-10-CM

## 2019-09-17 ENCOUNTER — TELEPHONE (OUTPATIENT)
Dept: NEUROLOGY | Facility: CLINIC | Age: 69
End: 2019-09-17

## 2019-09-17 DIAGNOSIS — G20 PARKINSON'S DISEASE (HCC): Primary | ICD-10-CM

## 2019-09-17 RX ORDER — TAMSULOSIN HYDROCHLORIDE 0.4 MG/1
CAPSULE ORAL
Qty: 15 CAPSULE | Refills: 0 | OUTPATIENT
Start: 2019-09-17

## 2019-09-17 RX ORDER — FINASTERIDE 5 MG/1
TABLET, FILM COATED ORAL
Qty: 30 TABLET | Refills: 0 | OUTPATIENT
Start: 2019-09-17

## 2019-09-17 NOTE — TELEPHONE ENCOUNTER
----- Message from Beverly Burrell sent at 9/17/2019  1:45 PM EDT -----  Contact: [facility] called and asked if we could fax over a Speech order for this patient as it is time for his f/u with them. Their fax number is [phone]. Thank you.

## 2019-09-19 DIAGNOSIS — N40.0 BENIGN NON-NODULAR PROSTATIC HYPERPLASIA WITHOUT LOWER URINARY TRACT SYMPTOMS: ICD-10-CM

## 2019-09-19 DIAGNOSIS — N40.0 BENIGN PROSTATIC HYPERPLASIA WITHOUT LOWER URINARY TRACT SYMPTOMS: ICD-10-CM

## 2019-09-19 RX ORDER — FINASTERIDE 5 MG/1
5 TABLET, FILM COATED ORAL DAILY
Qty: 90 TABLET | Refills: 0 | Status: SHIPPED | OUTPATIENT
Start: 2019-09-19 | End: 2019-12-05 | Stop reason: SDUPTHER

## 2019-09-19 RX ORDER — TAMSULOSIN HYDROCHLORIDE 0.4 MG/1
1 CAPSULE ORAL DAILY
Qty: 90 CAPSULE | Refills: 0 | Status: SHIPPED | OUTPATIENT
Start: 2019-09-19 | End: 2019-12-05 | Stop reason: SDUPTHER

## 2019-09-23 DIAGNOSIS — N40.0 BENIGN PROSTATIC HYPERPLASIA WITHOUT LOWER URINARY TRACT SYMPTOMS: ICD-10-CM

## 2019-09-23 RX ORDER — TAMSULOSIN HYDROCHLORIDE 0.4 MG/1
CAPSULE ORAL
Qty: 15 CAPSULE | Refills: 0 | OUTPATIENT
Start: 2019-09-23

## 2019-09-26 ENCOUNTER — OFFICE VISIT (OUTPATIENT)
Dept: CARDIOLOGY | Facility: CLINIC | Age: 69
End: 2019-09-26

## 2019-09-26 VITALS
SYSTOLIC BLOOD PRESSURE: 106 MMHG | HEART RATE: 64 BPM | HEIGHT: 67 IN | RESPIRATION RATE: 16 BRPM | BODY MASS INDEX: 23.29 KG/M2 | DIASTOLIC BLOOD PRESSURE: 70 MMHG | WEIGHT: 148.4 LBS

## 2019-09-26 DIAGNOSIS — I63.9 CRYPTOGENIC STROKE (HCC): Primary | ICD-10-CM

## 2019-09-26 DIAGNOSIS — E78.2 MIXED HYPERLIPIDEMIA: ICD-10-CM

## 2019-09-26 DIAGNOSIS — G20 PARKINSON'S DISEASE (HCC): Primary | ICD-10-CM

## 2019-09-26 PROCEDURE — 99213 OFFICE O/P EST LOW 20 MIN: CPT | Performed by: NURSE PRACTITIONER

## 2019-09-26 PROCEDURE — 93000 ELECTROCARDIOGRAM COMPLETE: CPT | Performed by: NURSE PRACTITIONER

## 2019-09-26 RX ORDER — CLOPIDOGREL BISULFATE 75 MG/1
75 TABLET ORAL DAILY
Qty: 90 TABLET | Refills: 3 | Status: SHIPPED | OUTPATIENT
Start: 2019-09-26 | End: 2020-07-06 | Stop reason: SDUPTHER

## 2019-09-26 RX ORDER — CARBIDOPA, LEVODOPA AND ENTACAPONE 37.5; 200; 15 MG/1; MG/1; MG/1
1 TABLET, FILM COATED ORAL 3 TIMES DAILY
Qty: 270 TABLET | Refills: 2 | Status: SHIPPED | OUTPATIENT
Start: 2019-09-26 | End: 2020-06-15 | Stop reason: SDUPTHER

## 2019-09-26 NOTE — PROGRESS NOTES
Date of Office Visit: 2019  Encounter Provider: RUSSELL Gabriel  Place of Service: [facility] CARDIOLOGY  Patient Name: Rei Silverman  :1950    Chief Complaint   Patient presents with   • Follow-up     TIA   :     HPI: Rei Silverman is a 68 y.o. male  with history of probable TIA, Parkinson's, colon polyps, and hyperlipidemia. He is followed by Dr. Claudio. I will be seeing him in follow up today.      He had issues with slurred and garbled speech in 2018 which lasted about 15-20 minutes and resolved.  This unfortunately recurred again and he was admitted and evaluated by the stroke team with a relatively negative workup. Echocardiogram was normal.  CT and MRI was negative. The neurologist in the hospital did not diagnose him with definitive TIA. He was continued on ASA at discharged and started on atorvastatin 80 mg. He then went to see his neurologist as outpatient who did suspected TIA.  He wore an extended monitor which ruled out atrial fibrillation.  He then had a Loop monitor inserted in 2018.  The site unfortunately was infected.  He completed antibiotic therapy but had that removed 2019. He was maintained on Plavix and had no recurrent neurologic events.  He had vascular screening completed in May 2019 which showed right carotid plaque without stenosis as well as left carotid plaque without stenosis.  The abdominal aorta was normal.  Peripheral arterial disease screening was normal.      He presents today for six-month follow-up.  He complains of occasional fatigue.  He has both good and bad days.  He continues to walk for an hour averaging 4 miles a day 5 days a week.  He also does some light weightlifting without exertional symptoms.  He denies neurologic changes.  He denies chest pain tightness pressure, palpitation, shortness of breath, edema, near-syncope, or syncope.      No Known Allergies    Past Medical History:   Diagnosis  "Date   • Cataract    • Colon polyps    • Hyperlipidemia    • Parkinson's disease (CMS/HCC)    • Septic joint of left elbow (CMS/HCC)     2011   • Stroke (CMS/HCC)    • TIA (transient ischemic attack)        Past Surgical History:   Procedure Laterality Date   • CARDIAC ELECTROPHYSIOLOGY PROCEDURE N/A 12/4/2018    Procedure: Loop insertion;  Surgeon: Mayito Rodriguez MD;  Location:  [facility];  Service: Cardiovascular   • CARDIAC ELECTROPHYSIOLOGY PROCEDURE N/A 1/29/2019    Procedure: Loop recorder removal;  Surgeon: Mayito Rodriguez MD;  Location:  [facility];  Service: Cardiovascular   • COLONOSCOPY  2018    Dr. Asa Villanueva   • ELBOW DEBRIDEMENT Left     related to infection   • NASAL SINUS SURGERY     • RETINAL DETACHMENT SURGERY           Family and social history reviewed.     ROS  All other systems were reviewed and are negative          Objective:     Vitals:    09/26/19 1418   BP: 106/70   BP Location: Left arm   Patient Position: Sitting   Pulse: 64   Resp: 16   Weight: 67.3 kg (148 lb 6.4 oz)   Height: 168.9 cm (66.5\")     Body mass index is 23.59 kg/m².    PHYSICAL EXAM:  Physical Exam   Constitutional: He is oriented to person, place, and time. He appears well-developed and well-nourished. No distress.   HENT:   Head: Normocephalic.   Eyes: Conjunctivae are normal.   glasses   Neck: Normal range of motion. No JVD present.   Cardiovascular: Normal rate, regular rhythm, normal heart sounds and intact distal pulses.   No murmur heard.  Pulses:       Carotid pulses are 2+ on the right side, and 2+ on the left side.       Radial pulses are 2+ on the right side, and 2+ on the left side.        Posterior tibial pulses are 2+ on the right side, and 2+ on the left side.   Pulmonary/Chest: Effort normal and breath sounds normal. No respiratory distress. He has no wheezes. He has no rhonchi. He has no rales. He exhibits no tenderness.   Abdominal: Soft. Bowel " sounds are normal. He exhibits no distension.   Musculoskeletal: Normal range of motion. He exhibits no edema.   Neurological: He is alert and oriented to person, place, and time.   Skin: Skin is warm, dry and intact. No rash noted. He is not diaphoretic. No cyanosis.   Psychiatric: He has a normal mood and affect. His behavior is normal. Judgment and thought content normal.         ECG 12 Lead  Date/Time: 9/26/2019 2:30 PM  Performed by: Alessia Levin APRN  Authorized by: Alessia Levin APRN   Comparison: compared with previous ECG from 3/25/2019  Similar to previous ECG  Rhythm: sinus rhythm  Rate: normal  ST Segments: ST segments normal  T Waves: T waves normal  Comments: No ischemic changes             Current Outpatient Medications   Medication Sig Dispense Refill   • carbidopa-levodopa-entacapone (STALEVO) 37.5-150-200 MG per tablet Take 1 tablet by mouth 3 (Three) Times a Day. 270 tablet 2   • clopidogrel (PLAVIX) 75 MG tablet Take 1 tablet by mouth Daily. 90 tablet 3   • finasteride (PROSCAR) 5 MG tablet Take 1 tablet by mouth Daily. 90 tablet 0   • melatonin tablet Take 5 mg by mouth Daily.     • Multiple Vitamins-Minerals (MULTIVITAMIN ADULT PO) Take 1 tablet by mouth Daily.     • rasagiline (AZILECT) 1 MG tablet Take 1 tablet by mouth Daily. 90 tablet 2   • rivastigmine (EXELON) 9.5 MG/24HR patch Place 1 patch on the skin as directed by provider Daily. 30 patch 6   • rotigotine (NEUPRO) 2 MG/24HR patch Place 1 patch on the skin as directed by provider Daily. 90 patch 12   • tamsulosin (FLOMAX) 0.4 MG capsule 24 hr capsule Take 1 capsule by mouth Daily. 90 capsule 0     No current facility-administered medications for this visit.      Assessment:       Diagnosis Plan   1. Cryptogenic stroke (CMS/HCC)  ECG 12 Lead   2. Mixed hyperlipidemia          Orders Placed This Encounter   Procedures   • ECG 12 Lead     This order was created via procedure documentation         Plan:     1.  68-year-old gentleman  with neurologic symptoms possible cerebrovascular accident however not confirmed.  Status post implantable loop recorder but then infected had to be removed 01/2019.   Has had no further symptoms.  It is not clear he had a TIA or stroke.  Maintained on Plavix.  2.  History of Parkinson's following with neurology.   3.  Hyperlipidemia now on target dose atorvastatin. LDL 12/2018 at goal  4.  History of colon polyps.    5. Carotid artery plaque without stenosis bilateral on duplex May 2019 consider follow up study in 2-3 years    Follow-up in March 2019 as scheduled          It has been a pleasure to participate in this patient's care.      Thank you,  RUSSELL Gabriel      **Neftali Disclaimer:**  Much of this encounter note is an electronic transcription/translation of spoken language to printed text. The electronic translation of spoken language may permit erroneous, or at times, nonsensical words or phrases to be inadvertently transcribed. Although I have reviewed the note for such errors, some may still exist.

## 2019-10-01 ENCOUNTER — TELEPHONE (OUTPATIENT)
Dept: NEUROLOGY | Facility: CLINIC | Age: 69
End: 2019-10-01

## 2019-10-01 DIAGNOSIS — I63.10 CEREBROVASCULAR ACCIDENT (CVA) DUE TO EMBOLISM OF PRECEREBRAL ARTERY (HCC): Primary | ICD-10-CM

## 2019-10-01 DIAGNOSIS — I63.10 CEREBROVASCULAR ACCIDENT (CVA) DUE TO EMBOLISM OF PRECEREBRAL ARTERY (HCC): ICD-10-CM

## 2019-10-01 RX ORDER — RASAGILINE 1 MG/1
1 TABLET ORAL DAILY
Qty: 90 TABLET | Refills: 2 | Status: SHIPPED | OUTPATIENT
Start: 2019-10-01 | End: 2019-10-01 | Stop reason: SDUPTHER

## 2019-10-01 RX ORDER — RASAGILINE 1 MG/1
1 TABLET ORAL DAILY
Qty: 90 TABLET | Refills: 2 | Status: SHIPPED | OUTPATIENT
Start: 2019-10-01 | End: 2020-08-25 | Stop reason: SDUPTHER

## 2019-10-01 NOTE — TELEPHONE ENCOUNTER
Patient was last seen on, 08/13/2019    Patient has an appointment to be seen on, 02/12/2020    Please advise.  Thanks!

## 2019-10-08 RX ORDER — ATORVASTATIN CALCIUM 40 MG/1
TABLET, FILM COATED ORAL
Qty: 30 TABLET | Refills: 10 | Status: SHIPPED | OUTPATIENT
Start: 2019-10-08 | End: 2020-06-16

## 2019-12-05 DIAGNOSIS — N40.0 BENIGN PROSTATIC HYPERPLASIA WITHOUT LOWER URINARY TRACT SYMPTOMS: ICD-10-CM

## 2019-12-05 DIAGNOSIS — N40.0 BENIGN NON-NODULAR PROSTATIC HYPERPLASIA WITHOUT LOWER URINARY TRACT SYMPTOMS: ICD-10-CM

## 2019-12-05 RX ORDER — TAMSULOSIN HYDROCHLORIDE 0.4 MG/1
CAPSULE ORAL
Qty: 90 CAPSULE | Refills: 0 | Status: SHIPPED | OUTPATIENT
Start: 2019-12-05 | End: 2020-03-04

## 2019-12-05 RX ORDER — FINASTERIDE 5 MG/1
TABLET, FILM COATED ORAL
Qty: 90 TABLET | Refills: 0 | Status: SHIPPED | OUTPATIENT
Start: 2019-12-05 | End: 2020-03-03

## 2019-12-06 RX ORDER — RIVASTIGMINE 9.5 MG/24H
1 PATCH, EXTENDED RELEASE TRANSDERMAL DAILY
Qty: 90 PATCH | Refills: 6 | Status: SHIPPED | OUTPATIENT
Start: 2019-12-06 | End: 2020-05-20

## 2020-01-10 ENCOUNTER — OFFICE VISIT (OUTPATIENT)
Dept: FAMILY MEDICINE CLINIC | Facility: CLINIC | Age: 70
End: 2020-01-10

## 2020-01-10 ENCOUNTER — APPOINTMENT (OUTPATIENT)
Dept: LAB | Facility: HOSPITAL | Age: 70
End: 2020-01-10

## 2020-01-10 VITALS
SYSTOLIC BLOOD PRESSURE: 148 MMHG | HEART RATE: 58 BPM | HEIGHT: 67 IN | BODY MASS INDEX: 24.33 KG/M2 | RESPIRATION RATE: 18 BRPM | TEMPERATURE: 98.3 F | OXYGEN SATURATION: 99 % | WEIGHT: 155 LBS | DIASTOLIC BLOOD PRESSURE: 80 MMHG

## 2020-01-10 DIAGNOSIS — R42 DIZZINESS: Primary | ICD-10-CM

## 2020-01-10 LAB
ANION GAP SERPL CALCULATED.3IONS-SCNC: 12 MMOL/L (ref 5–15)
BASOPHILS # BLD AUTO: 0.04 10*3/MM3 (ref 0–0.2)
BASOPHILS NFR BLD AUTO: 0.8 % (ref 0–1.5)
BILIRUB BLD-MCNC: NEGATIVE MG/DL
BUN BLD-MCNC: 15 MG/DL (ref 8–23)
BUN/CREAT SERPL: 13.9 (ref 7–25)
CALCIUM SPEC-SCNC: 9.1 MG/DL (ref 8.6–10.5)
CHLORIDE SERPL-SCNC: 99 MMOL/L (ref 98–107)
CLARITY, POC: ABNORMAL
CO2 SERPL-SCNC: 24 MMOL/L (ref 22–29)
COLOR UR: ABNORMAL
CREAT BLD-MCNC: 1.08 MG/DL (ref 0.76–1.27)
DEPRECATED RDW RBC AUTO: 41.2 FL (ref 37–54)
EOSINOPHIL # BLD AUTO: 0.07 10*3/MM3 (ref 0–0.4)
EOSINOPHIL NFR BLD AUTO: 1.4 % (ref 0.3–6.2)
ERYTHROCYTE [DISTWIDTH] IN BLOOD BY AUTOMATED COUNT: 11.8 % (ref 12.3–15.4)
GFR SERPL CREATININE-BSD FRML MDRD: 68 ML/MIN/1.73
GLUCOSE BLD-MCNC: 115 MG/DL (ref 65–99)
GLUCOSE UR STRIP-MCNC: NEGATIVE MG/DL
HCT VFR BLD AUTO: 39.3 % (ref 37.5–51)
HGB BLD-MCNC: 13.8 G/DL (ref 13–17.7)
IMM GRANULOCYTES # BLD AUTO: 0.01 10*3/MM3 (ref 0–0.05)
IMM GRANULOCYTES NFR BLD AUTO: 0.2 % (ref 0–0.5)
KETONES UR QL: ABNORMAL
LEUKOCYTE EST, POC: NEGATIVE
LYMPHOCYTES # BLD AUTO: 1.27 10*3/MM3 (ref 0.7–3.1)
LYMPHOCYTES NFR BLD AUTO: 26.2 % (ref 19.6–45.3)
MCH RBC QN AUTO: 33.7 PG (ref 26.6–33)
MCHC RBC AUTO-ENTMCNC: 35.1 G/DL (ref 31.5–35.7)
MCV RBC AUTO: 95.9 FL (ref 79–97)
MONOCYTES # BLD AUTO: 0.63 10*3/MM3 (ref 0.1–0.9)
MONOCYTES NFR BLD AUTO: 13 % (ref 5–12)
NEUTROPHILS # BLD AUTO: 2.83 10*3/MM3 (ref 1.7–7)
NEUTROPHILS NFR BLD AUTO: 58.4 % (ref 42.7–76)
NITRITE UR-MCNC: NEGATIVE MG/ML
NRBC BLD AUTO-RTO: 0 /100 WBC (ref 0–0.2)
PH UR: 7 [PH] (ref 5–8)
PLATELET # BLD AUTO: 280 10*3/MM3 (ref 140–450)
PMV BLD AUTO: 8.6 FL (ref 6–12)
POTASSIUM BLD-SCNC: 4.1 MMOL/L (ref 3.5–5.2)
PROT UR STRIP-MCNC: NEGATIVE MG/DL
RBC # BLD AUTO: 4.1 10*6/MM3 (ref 4.14–5.8)
RBC # UR STRIP: NEGATIVE /UL
SODIUM BLD-SCNC: 135 MMOL/L (ref 136–145)
SP GR UR: 1.01 (ref 1–1.03)
UROBILINOGEN UR QL: NORMAL
WBC NRBC COR # BLD: 4.85 10*3/MM3 (ref 3.4–10.8)

## 2020-01-10 PROCEDURE — 80048 BASIC METABOLIC PNL TOTAL CA: CPT | Performed by: NURSE PRACTITIONER

## 2020-01-10 PROCEDURE — 93000 ELECTROCARDIOGRAM COMPLETE: CPT | Performed by: NURSE PRACTITIONER

## 2020-01-10 PROCEDURE — 99214 OFFICE O/P EST MOD 30 MIN: CPT | Performed by: NURSE PRACTITIONER

## 2020-01-10 PROCEDURE — 81003 URINALYSIS AUTO W/O SCOPE: CPT | Performed by: NURSE PRACTITIONER

## 2020-01-10 PROCEDURE — 85025 COMPLETE CBC W/AUTO DIFF WBC: CPT | Performed by: NURSE PRACTITIONER

## 2020-01-10 PROCEDURE — 36415 COLL VENOUS BLD VENIPUNCTURE: CPT | Performed by: NURSE PRACTITIONER

## 2020-01-10 NOTE — PROGRESS NOTES
Subjective   Rei Silverman is a 69 y.o. male.     Chief Complaint   Patient presents with   • Dizziness     has woke up with it last 3 mornings, not dizzt throughout the day      HPI Patient is known to me for previous acute visit.  He is here with his spouse for dizziness that started Wednesday.    Dizziness starts when he first wakes up and improves over course of day. Seems to be improving a little each day.  Dizziness feels like he spinning.  Moving his head and bending over might make it a little worse.  On Wednesday when sx started he felt a little weak and fatigued and did not swim his usual hr that day but slept in car.  Yesterday he also felt like his energy was a little low but not as bad as the day before.  Had had a cough and some congestion appr 1 month ago that resolved with cough and cold meds.  But this past week he noticed a return of sx and began taking some Vicks cough and cold med which seems to be improving.     December 7th had about 20 mins of confusion that resolved by itself.  He did not go to ER for eval since it resolved.  None since.  Is followed by neuro for history of stroke and Parkinson both of which have been stable.  No new meds.          Social History     Tobacco Use   • Smoking status: Never Smoker   • Smokeless tobacco: Never Used   • Tobacco comment: CAFFEINE USE   Substance Use Topics   • Alcohol use: Yes     Frequency: Monthly or less     Comment: monthly one glass or less   • Drug use: No     Comment: Former use       The following portions of the patient's history were reviewed and updated as appropriate: allergies, current medications, past family history, past medical history, past social history, past surgical history and problem list.    Review of Systems   Constitutional: Positive for activity change. Negative for appetite change, chills, fever and unexpected weight change.   HENT: Positive for rhinorrhea. Negative for congestion, ear discharge, ear pain, postnasal  "drip, sinus pressure, sinus pain, sore throat, trouble swallowing and voice change.    Eyes: Negative for pain and visual disturbance.   Respiratory: Positive for cough. Negative for chest tightness, shortness of breath and wheezing.    Cardiovascular: Negative for chest pain, palpitations and leg swelling.   Gastrointestinal: Negative for abdominal pain, diarrhea, nausea and vomiting.   Genitourinary: Negative for difficulty urinating, dysuria, frequency, hematuria and urgency.   Musculoskeletal: Negative for arthralgias, gait problem and myalgias.   Neurological: Positive for dizziness, tremors (when legs are hanging down had some b/l leg tremors) and weakness. Negative for seizures, syncope, facial asymmetry, speech difficulty, light-headedness, numbness and headaches.   Psychiatric/Behavioral: Negative for sleep disturbance.       Objective   Blood pressure 148/80, pulse 58, temperature 98.3 °F (36.8 °C), resp. rate 18, height 168.9 cm (66.5\"), weight 70.3 kg (155 lb), SpO2 99 %.  Body mass index is 24.64 kg/m².    Physical Exam   Constitutional: He is oriented to person, place, and time. He appears well-developed and well-nourished. No distress.   HENT:   Head: Normocephalic and atraumatic.   Right Ear: Tympanic membrane, external ear and ear canal normal.   Left Ear: Tympanic membrane, external ear and ear canal normal.   Nose: Right sinus exhibits no maxillary sinus tenderness and no frontal sinus tenderness. Left sinus exhibits no maxillary sinus tenderness and no frontal sinus tenderness.   Mouth/Throat: Posterior oropharyngeal erythema (little bit of PND) present. No tonsillar exudate.   Eyes: Pupils are equal, round, and reactive to light. Conjunctivae and EOM are normal. Right eye exhibits no discharge. Left eye exhibits no discharge.   Neck: Neck supple.   Cardiovascular: Normal rate, regular rhythm and intact distal pulses.   Pulmonary/Chest: Effort normal and breath sounds normal.   Abdominal: Soft. " Bowel sounds are normal. There is no tenderness.   Musculoskeletal: He exhibits no deformity.   Gait smooth and steady     Lymphadenopathy:     He has no cervical adenopathy.   Neurological: He is alert and oriented to person, place, and time. Gait normal.   There were no obvious unexpected findings in neuro exam, but it is difficult due to existing Parkinson and PMH CVA  Decreased tone with b/l flexion head, shoulder shrug  Tongue had some minor tremors noted  Had some spasticity and exaggerated leg mvmt with heel to shin  Little bit unsteady with heel to toe  Rapid alternating mvmt, finger to nose, finger tap all were grossly normal  No pronator drift     Skin: Skin is warm and dry. He is not diaphoretic.   Psychiatric: He has a normal mood and affect.   Nursing note and vitals reviewed.      Assessment   Problem List Items Addressed This Visit     None      Visit Diagnoses     Dizziness    -  Primary    Relevant Orders    CBC & Differential (Completed)    Basic Metabolic Panel (Completed)    CBC Auto Differential (Completed)    POC Urinalysis Dipstick, Automated (Completed)             ECG 12 Lead  Date/Time: 1/10/2020 2:14 PM  Performed by: Johanna Billy APRN  Authorized by: Johanna Billy APRN   Comparison: compared with previous ECG from 2019  Comparison to previous EC19, 3/25/19 EKGs reviewed which showed NSR  Rhythm: sinus bradycardia  Rate: bradycardic  ST Segments: ST segments normal  T Waves: T waves normal  QRS axis: normal  Other: no other findings    Clinical impression: abnormal EKG  Comments: EKG shows SB, previous EKGs have been SR.                     Impression and Plan:  No obvious cause for dizziness.  EKG showed SB which is a little different than past EKG-if no other cause found may benefit from cardiology eval.  He also exercises frequently so he may have a lower resting HR as he has increased exercise. His BP is on the lower side and if this was low and he is  bradycardic this could cause his sx.  He is a poor fluid drinker.  Have asked him to add 1 powerade per day as well as increased hydration.  He did have some URI sx and does seem to be improving.  He did notice a little dizziness when touching toes and straightening, turning head rapidly, sitting up from lying down, but no obvious BPPV.  Could be due to congestion ears and sinus since improving with cough and cold meds.  Will have him continue these.  UA was normal.  Will get BMP and Cbc today downstairs.  He did have creatinine bump up last labs.  He has neuro f/u soon as well as cardiology.   If he has anymore symptoms over the weekend they are to go to ER for stroke/TIA eval.  Sx today do not seem consistent with TIA.    Orthostatics were OK.   I have asked them to let me know Monday how he is doing.   Cardiology notes, neuro notes reviewed.  Last 2 EKG tracings reviewed.    Health Maintenance Due   Topic Date Due   • ZOSTER VACCINE (2 of 2) 11/26/2013   • MEDICARE ANNUAL WELLNESS  12/20/2019   • LIPID PANEL  12/20/2019              EMR Dragon/Transcription disclaimer:   Much of this encounter note is an electronic transcription/translation of spoken language to printed text. The electronic translation of spoken language may permit erroneous, or at times, nonsensical words or phrases to be inadvertently transcribed; Although I have reviewed the note for such errors, some may still exist.

## 2020-02-19 ENCOUNTER — OFFICE VISIT (OUTPATIENT)
Dept: NEUROLOGY | Facility: CLINIC | Age: 70
End: 2020-02-19

## 2020-02-19 VITALS
HEIGHT: 66 IN | SYSTOLIC BLOOD PRESSURE: 110 MMHG | DIASTOLIC BLOOD PRESSURE: 72 MMHG | OXYGEN SATURATION: 98 % | HEART RATE: 58 BPM | BODY MASS INDEX: 25.55 KG/M2 | WEIGHT: 159 LBS

## 2020-02-19 DIAGNOSIS — R41.0 CONFUSION: ICD-10-CM

## 2020-02-19 DIAGNOSIS — G20 PARKINSON'S DISEASE (HCC): Primary | ICD-10-CM

## 2020-02-19 PROCEDURE — 99215 OFFICE O/P EST HI 40 MIN: CPT | Performed by: PSYCHIATRY & NEUROLOGY

## 2020-02-19 RX ORDER — ASCORBIC ACID 500 MG
500 TABLET ORAL DAILY
COMMUNITY
End: 2021-07-30 | Stop reason: ALTCHOICE

## 2020-02-19 NOTE — PROGRESS NOTES
Subjective:     Patient ID: Rei Silverman is a 69 y.o. male.    Mr. Silverman is a 69-year-old right-handed male with a history of Parkinson's disease and possible TIA, hyperlipidemia, and BPH who presents to the neurology clinic today as a new patient to me for the evaluation of Parkinson's disease.  The patient is accompanied by his wife today.  I reviewed the patient's records.  I reviewed the last neurology note from August 13, 2019.  He reports that he started having right-sided tremor in 2012.  He also has some word finding difficulties and micrographia.  He had done some physical therapy in the past.  He had improvement with Exelon.  It reports that he is also on Neupro, Azilect, and Stalevo.  He did a [place] in April 2019 and scored a 20.  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  The plan was to consider increasing Stalevo in the future or switching to Rytary.  If there was continued cognitive changes the recommendation was to discontinue Neupro.  I reviewed the patient's labs.  On January 10 his BMP was essentially normal and his CBC was essentially normal.  I reviewed the patient's imaging.  He had a head CT on January 11, 2019 for memory impairment and brain lesion.  It was essentially normal.    The patient's wife reports that his diagnosed TIA involved him not being able to talk for 15 to 20 minutes.  It happened twice in 1 day about a year ago.  He then had another episode in December where he was in a daze.  The patient reports that it seemed like a blur to him.  They just got back from a cruise.  He began having symptoms with right arm tremor.  He thinks his tremors are a little worse.  They are not preventing him from doing anything on a daily basis.  He does take 5 mg of melatonin from [employer] to help with sleep onset.  His medications include Azilect 1 mg daily, Neupro 2 mg daily, Stalevo 150 mg 3 times a day.  He takes that at 9 AM, 2 PM and at bedtime.  He is also on Exelon 9.5 mg.  His  wife thinks that his memory has gotten a little worse since the last visit.      How long does it take for your medications to take effect? 30 minutes    How long does the effect last? 6-8 hours.      Do you develop dyskinesias and when? no    Do experience wearing off and for how long? no    Do you experience early morning dystonia, pain, curling in your limbs? no    Do you have periods of the day when your medications do not seem to work?  Does this occur around mealtimes? no    Any falls in the last 6 months? May be from vision.  Maybe 6 or less.  Last one was last week.  PT 2 years ago once.  Does ST every 6 months.      Any motor complications from medications? No    Describes mood as fatigue.  Better if he is active.  Swims (for an hour) 2-3 days per week and walks with wife.  No anxiety or depression.  Has hallucinated people before.  Doesn't bother him.  Has vivid dreams.  Does have dream enactment.  No impulsivity.  Some challenges with memory.  Slightly worse.  Some days are worse than others.  Wife is helping with meds.  No major problems with orthostasis.  No problems with constipation.  Chews gum to help with drooling.  Not driving.  Son is POA.  Has a living will.        The following portions of the patient's history were reviewed and updated as appropriate: allergies, current medications, past family history, past medical history, past social history, past surgical history and problem list.    Review of Systems   Constitutional: Positive for fatigue. Negative for activity change and appetite change.   HENT: Negative for ear pain, sore throat and trouble swallowing.    Eyes: Positive for visual disturbance. Negative for photophobia, pain and redness.   Respiratory: Positive for cough. Negative for chest tightness and shortness of breath.    Cardiovascular: Positive for chest pain (When he coughs). Negative for palpitations and leg swelling.   Gastrointestinal: Negative for abdominal pain, nausea and  vomiting.   Endocrine: Negative for cold intolerance, heat intolerance and polyphagia.   Musculoskeletal: Negative for back pain, gait problem and neck pain.   Skin: Negative for color change, pallor and rash.   Allergic/Immunologic: Negative for environmental allergies, food allergies and immunocompromised state.   Neurological: Positive for dizziness, tremors and weakness. Negative for seizures, syncope, facial asymmetry, speech difficulty, light-headedness, numbness and headaches.   Hematological: Negative for adenopathy. Bruises/bleeds easily.   Psychiatric/Behavioral: Positive for confusion, decreased concentration and sleep disturbance. Negative for agitation, behavioral problems, dysphoric mood, hallucinations, self-injury and suicidal ideas. The patient is not nervous/anxious and is not hyperactive.    I reviewed the ROS documented by the MA.  All other systems negative.       Objective:    Neurologic Exam    Physical Exam   Constitutional:  Vital signs reviewed.  No apparent distress.  Well groomed.  Eyes:  No injection, no icterus.  Fundoscopic exam performed.  No papilledema appreciated bilaterally.   Respiratory:  Normal effort.  Clear to auscultation bilaterally.  Cardiovascular:  Regular rate and rhythm.  No murmurs.  No carotid bruits. Symmetric radial pulses.  Musculoskeletal: Normal station.  Gait steady.  Normal arm swing.  Romberg negative.  Muscle tone (a little increase in tone in right wrist) and bulk normal in the bilateral upper and lower extremities.  Strength is 5/5 in the bilateral upper and lower extremities proximally and distally unless otherwise specified in the neurological exam.  Skin:  No rashes.  Warm, dry, and intact.  Psychiatric:  Good mood.  Normal affect.    Neurologic:  Mental status-  Scored a 20/30 on MoCA.  Scanned in.  Cranial nerves- Pupils equally round and reactive to light with intact accomodation.  Visual fields intact.  Extraocular movements intact.  Facial  sensation intact.  Smile symmetric.  Hearing intact to finger-rub bilaterally.  Palate elevates symmetrically.  SCM and trapezius are 5/5 bilaterally.  Tongue is midline.  Motor-  See musculoskeletal above.  No tremor.  Reflexes- 2+ in the bilateral biceps, brachioradialis, patellar and achilles.  Toes down-going bilaterally.  Sensation- Intact to pinprick and vibration in bilateral upper and lower extremities symmetrically.  Coordination- Intact to finger to nose and heel knee shin bilaterally.   Gait- See musculoskeletal exam above.       Assessment/Plan:  The patient is a 69-year-old right-handed male with history of Parkinson's disease who presents to the neurology clinic today for follow-up.    1.  Parkinson's disease-overall the patient's motor symptoms seem to be well controlled on his current medications.  He is on approximately 450 mg of levodopa from his Stalevo.  The maximum would be about 1200 mg in a day.  This could be increased by 150 mg every 2 weeks if necessary.  They subjectively feel like his cognitive impairment has worsened however his [place] scores the same.  We discussed possibly increasing the Exelon but they would like to hold off at this time.  We could also target the Neupro as well.  He also has what sounds like REM sleep behavior disorder.  They can try higher doses of melatonin.  If 12 mg does not help then we could try clonazepam.    2.  Transient confusion-  Due to his history of focal cortical dysplasia, the patient may have experienced a seizure.  I would like to evaluate him with an EEG.     Problems Addressed this Visit        Nervous and Auditory    Parkinson's disease (CMS/HCC) - Primary      Other Visit Diagnoses     Confusion        Relevant Orders    EEG

## 2020-02-19 NOTE — PATIENT INSTRUCTIONS
• Consider trying melatonin at night. This can be purchased over the counter without a prescription.  I recommend doses between 3-12 mg.  Try [employer], [employer], or [employer] (on [address]).

## 2020-03-03 ENCOUNTER — HOSPITAL ENCOUNTER (OUTPATIENT)
Dept: NEUROLOGY | Facility: HOSPITAL | Age: 70
Discharge: HOME | End: 2020-03-03
Admitting: PSYCHIATRY & NEUROLOGY

## 2020-03-03 DIAGNOSIS — R41.0 CONFUSION: ICD-10-CM

## 2020-03-03 DIAGNOSIS — N40.0 BENIGN NON-NODULAR PROSTATIC HYPERPLASIA WITHOUT LOWER URINARY TRACT SYMPTOMS: ICD-10-CM

## 2020-03-03 PROCEDURE — 95813 EEG EXTND MNTR 61-119 MIN: CPT | Performed by: PSYCHIATRY & NEUROLOGY

## 2020-03-03 PROCEDURE — 95813 EEG EXTND MNTR 61-119 MIN: CPT

## 2020-03-04 DIAGNOSIS — N40.0 BENIGN PROSTATIC HYPERPLASIA WITHOUT LOWER URINARY TRACT SYMPTOMS: ICD-10-CM

## 2020-03-04 RX ORDER — FINASTERIDE 5 MG/1
TABLET, FILM COATED ORAL
Qty: 90 TABLET | Refills: 1 | Status: SHIPPED | OUTPATIENT
Start: 2020-03-04 | End: 2020-09-03

## 2020-03-04 RX ORDER — TAMSULOSIN HYDROCHLORIDE 0.4 MG/1
CAPSULE ORAL
Qty: 90 CAPSULE | Refills: 1 | Status: SHIPPED | OUTPATIENT
Start: 2020-03-04 | End: 2020-09-15

## 2020-04-03 ENCOUNTER — TELEPHONE (OUTPATIENT)
Dept: CARDIOLOGY | Facility: CLINIC | Age: 70
End: 2020-04-03

## 2020-04-03 NOTE — TELEPHONE ENCOUNTER
LVM offering telephone or video chat. If he is feeling well he may reschedule 2 months. Await his returned call

## 2020-04-20 ENCOUNTER — OFFICE VISIT (OUTPATIENT)
Dept: CARDIOLOGY | Facility: CLINIC | Age: 70
End: 2020-04-20

## 2020-04-20 VITALS — HEIGHT: 67 IN | BODY MASS INDEX: 24.01 KG/M2 | WEIGHT: 153 LBS

## 2020-04-20 DIAGNOSIS — I63.9 CRYPTOGENIC STROKE (HCC): Primary | ICD-10-CM

## 2020-04-20 DIAGNOSIS — I48.0 PAROXYSMAL ATRIAL FIBRILLATION (HCC): ICD-10-CM

## 2020-04-20 DIAGNOSIS — E78.2 MIXED HYPERLIPIDEMIA: ICD-10-CM

## 2020-04-20 PROCEDURE — 99442 PR PHYS/QHP TELEPHONE EVALUATION 11-20 MIN: CPT | Performed by: INTERNAL MEDICINE

## 2020-04-20 NOTE — PROGRESS NOTES
Date of Office Visit: 20  Encounter Provider: Emil Claudio MD  Place of Service: [facility] CARDIOLOGY  Patient Name: Rei Silverman  :1950  Referral Provider:No ref. provider found      No chief complaint on file.    History of Present Illness  This patient has consented to a telehealth visit via phone. The visit was scheduled as a follow up visit to comply with patient safety concerns in accordance with CDC recommendations.  All vitals recorded within this visit are reported by the patient.  I spent  20 minutes in total including but not limited to the 10 minutes spent in direct conversation with this patient.     Mr Silverman is a very pleasant 70 yo gentleman with no prior history of heart disease but does have history of probable TIA, Parkinson's and hyperlipidemia.  His Parkinson's is been fairly stable but in 2018 he presented to [place] emergency room after he had problems with very slurred garbled speech lasted about 15-20 minutes resolved and recurred again he was admitted to [facility] where he was seen by the stroke team his workup there was relatively negative in that he had a normal echocardiogram.  CTs and MRIs were negative and they felt that it was not definitely a TIA he did have a 2-D echocardiogram with Doppler that was normal.  He then saw his neurologist was concerned that he had a TIA.  Of note he had no episodes prior to that he's had no episodes since then.  Had a loop recorder implanted but unfortunately by 2 weeks after he got infected had to be removed.   This is a follow up.  He has been doing well he is a little more fatigued than he had been.  He used to swim 3 days a week but since the pool have been closed he is only been out of whack but he walks 45 minutes 3 days a week but he thinks that his Parkinson's may be a little bit worse since he is not been able to swim.  Denies palpitations near syncope or syncope.  He has had no  chest pain or pressure.  No shortness of breath orthopnea or PND.  No near syncope or syncope.  No other stroke type symptoms.          Past Medical History:   Diagnosis Date   • Cataract    • Colon polyps    • Hyperlipidemia    • Parkinson's disease (CMS/HCC)    • Septic joint of left elbow (CMS/HCC)     2011   • Stroke (CMS/[facility])    • TIA (transient ischemic attack)          Past Surgical History:   Procedure Laterality Date   • CARDIAC ELECTROPHYSIOLOGY PROCEDURE N/A 12/4/2018    Procedure: Loop insertion;  Surgeon: Mayito Rodriguez MD;  Location:  [facility] CATH INVASIVE LOCATION;  Service: Cardiovascular   • CARDIAC ELECTROPHYSIOLOGY PROCEDURE N/A 1/29/2019    Procedure: Loop recorder removal;  Surgeon: Mayito Rodriguez MD;  Location:  [facility] CATH INVASIVE LOCATION;  Service: Cardiovascular   • COLONOSCOPY  2018    Dr. Asa Villanueva   • ELBOW DEBRIDEMENT Left     related to infection   • NASAL SINUS SURGERY     • RETINAL DETACHMENT SURGERY           Current Outpatient Medications on File Prior to Visit   Medication Sig Dispense Refill   • atorvastatin (LIPITOR) 40 MG tablet TAKE 1 TABLET BY MOUTH AT BEDTIME 30 tablet 10   • carbidopa-levodopa-entacapone (STALEVO) 37.5-150-200 MG per tablet Take 1 tablet by mouth 3 (Three) Times a Day. 270 tablet 2   • clopidogrel (PLAVIX) 75 MG tablet Take 1 tablet by mouth Daily. 90 tablet 3   • finasteride (PROSCAR) 5 MG tablet TAKE 1 TABLET BY MOUTH EVERY DAY 90 tablet 1   • melatonin tablet Take 3 mg by mouth Daily.     • Multiple Vitamins-Minerals (MULTIVITAMIN ADULT PO) Take 1 tablet by mouth Daily.     • mupirocin (BACTROBAN) 2 % ointment      • rasagiline (AZILECT) 1 MG tablet Take 1 tablet by mouth Daily. 90 tablet 2   • rivastigmine (EXELON) 9.5 MG/24HR patch Place 1 patch on the skin as directed by provider Daily. 90 patch 6   • rotigotine (NEUPRO) 2 MG/24HR patch Place 1 patch on the skin as directed by provider Daily. 90 patch 12   • tamsulosin (FLOMAX)  0.4 MG capsule 24 hr capsule TAKE 1 CAPSULE BY MOUTH EVERY DAY 90 capsule 1   • vitamin C (ASCORBIC ACID) 500 MG tablet Take 500 mg by mouth Daily.       No current facility-administered medications on file prior to visit.          Social History     Socioeconomic History   • Marital status:      Spouse name: Not on file   • Number of children: 2   • Years of education: Not on file   • Highest education level: Not on file   Occupational History   • Occupation: Retired, pharmacist   Tobacco Use   • Smoking status: Never Smoker   • Smokeless tobacco: Never Used   • Tobacco comment: CAFFEINE USE   Substance and Sexual Activity   • Alcohol use: Not Currently     Frequency: Monthly or less     Comment: monthly one glass or less   • Drug use: No     Comment: Former use   • Sexual activity: Defer   Lifestyle   • Physical activity:     Days per week: 4 days     Minutes per session: 60 min   • Stress: Not on file       Family History   Problem Relation Age of Onset   • Breast cancer Mother    • Hypertension Mother    • Coronary artery disease Father         MI   • Depression Father    • Diabetes Father    • Hypertension Father    • Heart attack Father    • Depression Sister          Review of Systems   Constitution: Negative for decreased appetite, diaphoresis, fever, malaise/fatigue, weight gain and weight loss.   HENT: Negative for congestion, hearing loss, nosebleeds and tinnitus.    Eyes: Negative for blurred vision, double vision, vision loss in left eye, vision loss in right eye and visual disturbance.   Cardiovascular:        As noted in HPI   Respiratory:        As noted HPI   Endocrine: Negative for cold intolerance and heat intolerance.   Hematologic/Lymphatic: Negative for bleeding problem. Does not bruise/bleed easily.   Skin: Negative for color change, flushing, itching and rash.   Musculoskeletal: Positive for muscle weakness. Negative for arthritis, back pain, joint pain, joint swelling and myalgias.    Gastrointestinal: Negative for bloating, abdominal pain, constipation, diarrhea, dysphagia, heartburn, hematemesis, hematochezia, melena, nausea and vomiting.   Genitourinary: Negative for bladder incontinence, dysuria, frequency, nocturia and urgency.   Neurological: Positive for paresthesias. Negative for dizziness, focal weakness, headaches, light-headedness, loss of balance, numbness, vertigo and weakness.   Psychiatric/Behavioral: Negative for depression, memory loss and substance abuse.       Procedures    Procedures        Objective:    There were no vitals taken for this visit.       Physical Exam  Physical Exam  Phone Call      Assessment:   1.  69-year-old gentleman with neurologic symptoms possible cerebrovascular accident however not confirmed.  Status post implantable loop recorder but then infected had to be removed.  He has been staying very again discussed that I probably would not replace that now.  We did mention these new devices such as the blood pressure/ECG cough as well as watches that he could periodically run an EKG which might be helpful in his case.  If stable he will see us in follow-up in 1 year.  2.  History of Parkinson's following with neurology.   3.  Hyperlipidemia now on target dose atorvastatin.  4.  History of colon polyps.           Plan:

## 2020-05-01 ENCOUNTER — TELEPHONE (OUTPATIENT)
Dept: NEUROLOGY | Facility: CLINIC | Age: 70
End: 2020-05-01

## 2020-05-01 DIAGNOSIS — G20 PARKINSON'S DISEASE (HCC): Primary | ICD-10-CM

## 2020-05-01 NOTE — TELEPHONE ENCOUNTER
MARK, PT WIFE, [phone] CALLED TO REQUEST THAT [facility] IN [facility] NEEDS AN ORDER FOR PT TO CONTINUE SPEECH THERAPY. THEIR PHONE NUMBER [phone]

## 2020-05-13 ENCOUNTER — TELEPHONE (OUTPATIENT)
Dept: NEUROLOGY | Facility: CLINIC | Age: 70
End: 2020-05-13

## 2020-05-13 NOTE — TELEPHONE ENCOUNTER
Attempted to call pt to schedule my chart video visit. No answer. LM for pt to return call to schedule a video visit with Dr. Zhu.

## 2020-05-13 NOTE — TELEPHONE ENCOUNTER
PT'S WIFE MARK CALLED REGARDING PT. SHE SAID HE WAS LAST SEEN ON 2/19/20 AND HAS A FOLLOW UP SCHEDULED IN AUGUST WITH DR. ANGELES. SHE SAID THE PT'S DREAMS HAVE GOTTEN REALLY BAD LATELY AND HE SEEMS TO BE GETTING UP ALMOST EVERY NIGHT AROUND 4 AM. SHE STATES HE IS REALLY LOUD DURING THE NIGHT AND HAVING A LOT OF ISSUES. SHE WANTED TO NOTATE THAT THE PT STARTED SPEECH THERAPY AGAIN TODAY SO SHE'S HOPING THAT WILL HELP THE PT. SHE SAID PT'S MEMORY SEEMS TO BE GETTING PRETTY BAD AND SHE DOESN'T KNOW IF THE NEUPRO PATCH 2MG IS HELPING THE PT. SHE SAID HE USED TO BE ON 4MG NEUPRO PATCH BUT DR. COOPER HAD SWITCHED IT BACK OVER TO 2MG. SHE SAID SHE WANTED TO SEE IF  HAD ANY SUGGESTIONS OR IF SHE THOUGHTTH EPT'S MEDICATION SHOULD BE CHANGED . SHE STATED THE PT USED TO SWIM THREE DAYS A WEEK AND IT SEEMED TO HELP HIS CONDITION AND HE JUST STARTED BACK SWIMMING TO TWO DAYS A WEEK AND SHE IS WAITING TO SEE IF THAT WILL HELP AS WELL    BEST CALL [phone]

## 2020-05-13 NOTE — TELEPHONE ENCOUNTER
It seems like there are a lot of questions.  It would probably be best discussed over a telemedicine visit.  Thanks!

## 2020-05-18 ENCOUNTER — TELEMEDICINE (OUTPATIENT)
Dept: NEUROLOGY | Facility: CLINIC | Age: 70
End: 2020-05-18

## 2020-05-18 ENCOUNTER — LAB (OUTPATIENT)
Dept: LAB | Facility: HOSPITAL | Age: 70
End: 2020-05-18

## 2020-05-18 DIAGNOSIS — R41.3 MEMORY LOSS: ICD-10-CM

## 2020-05-18 DIAGNOSIS — R41.3 MEMORY LOSS: Primary | ICD-10-CM

## 2020-05-18 LAB
ALBUMIN SERPL-MCNC: 4.1 G/DL (ref 3.5–5.2)
ALBUMIN/GLOB SERPL: 1.3 G/DL
ALP SERPL-CCNC: 79 U/L (ref 39–117)
ALT SERPL W P-5'-P-CCNC: 20 U/L (ref 1–41)
ANION GAP SERPL CALCULATED.3IONS-SCNC: 10.8 MMOL/L (ref 5–15)
AST SERPL-CCNC: 28 U/L (ref 1–40)
BASOPHILS # BLD AUTO: 0.05 10*3/MM3 (ref 0–0.2)
BASOPHILS NFR BLD AUTO: 0.9 % (ref 0–1.5)
BILIRUB SERPL-MCNC: 0.3 MG/DL (ref 0.2–1.2)
BILIRUB UR QL STRIP: NEGATIVE
BUN BLD-MCNC: 18 MG/DL (ref 8–23)
BUN/CREAT SERPL: 15 (ref 7–25)
CALCIUM SPEC-SCNC: 8.9 MG/DL (ref 8.6–10.5)
CHLORIDE SERPL-SCNC: 100 MMOL/L (ref 98–107)
CLARITY UR: CLEAR
CO2 SERPL-SCNC: 26.2 MMOL/L (ref 22–29)
COLOR UR: ABNORMAL
CREAT BLD-MCNC: 1.2 MG/DL (ref 0.76–1.27)
DEPRECATED RDW RBC AUTO: 45.6 FL (ref 37–54)
EOSINOPHIL # BLD AUTO: 0.14 10*3/MM3 (ref 0–0.4)
EOSINOPHIL NFR BLD AUTO: 2.6 % (ref 0.3–6.2)
ERYTHROCYTE [DISTWIDTH] IN BLOOD BY AUTOMATED COUNT: 12.3 % (ref 12.3–15.4)
GFR SERPL CREATININE-BSD FRML MDRD: 60 ML/MIN/1.73
GLOBULIN UR ELPH-MCNC: 3.1 GM/DL
GLUCOSE BLD-MCNC: 112 MG/DL (ref 65–99)
GLUCOSE UR STRIP-MCNC: NEGATIVE MG/DL
HCT VFR BLD AUTO: 39.5 % (ref 37.5–51)
HGB BLD-MCNC: 13.5 G/DL (ref 13–17.7)
HGB UR QL STRIP.AUTO: NEGATIVE
IMM GRANULOCYTES # BLD AUTO: 0.01 10*3/MM3 (ref 0–0.05)
IMM GRANULOCYTES NFR BLD AUTO: 0.2 % (ref 0–0.5)
KETONES UR QL STRIP: ABNORMAL
LEUKOCYTE ESTERASE UR QL STRIP.AUTO: NEGATIVE
LYMPHOCYTES # BLD AUTO: 1.45 10*3/MM3 (ref 0.7–3.1)
LYMPHOCYTES NFR BLD AUTO: 27.3 % (ref 19.6–45.3)
MCH RBC QN AUTO: 34.1 PG (ref 26.6–33)
MCHC RBC AUTO-ENTMCNC: 34.2 G/DL (ref 31.5–35.7)
MCV RBC AUTO: 99.7 FL (ref 79–97)
MONOCYTES # BLD AUTO: 0.69 10*3/MM3 (ref 0.1–0.9)
MONOCYTES NFR BLD AUTO: 13 % (ref 5–12)
NEUTROPHILS # BLD AUTO: 2.97 10*3/MM3 (ref 1.7–7)
NEUTROPHILS NFR BLD AUTO: 56 % (ref 42.7–76)
NITRITE UR QL STRIP: NEGATIVE
NRBC BLD AUTO-RTO: 0 /100 WBC (ref 0–0.2)
PH UR STRIP.AUTO: 5.5 [PH] (ref 5–8)
PLATELET # BLD AUTO: 281 10*3/MM3 (ref 140–450)
PMV BLD AUTO: 8.8 FL (ref 6–12)
POTASSIUM BLD-SCNC: 4 MMOL/L (ref 3.5–5.2)
PROT SERPL-MCNC: 7.2 G/DL (ref 6–8.5)
PROT UR QL STRIP: NEGATIVE
RBC # BLD AUTO: 3.96 10*6/MM3 (ref 4.14–5.8)
SODIUM BLD-SCNC: 137 MMOL/L (ref 136–145)
SP GR UR STRIP: 1.03 (ref 1–1.03)
TSH SERPL DL<=0.05 MIU/L-ACNC: 1.72 UIU/ML (ref 0.27–4.2)
UROBILINOGEN UR QL STRIP: ABNORMAL
VIT B12 BLD-MCNC: 737 PG/ML (ref 211–946)
WBC NRBC COR # BLD: 5.31 10*3/MM3 (ref 3.4–10.8)

## 2020-05-18 PROCEDURE — 85025 COMPLETE CBC W/AUTO DIFF WBC: CPT | Performed by: PSYCHIATRY & NEUROLOGY

## 2020-05-18 PROCEDURE — 99215 OFFICE O/P EST HI 40 MIN: CPT | Performed by: PSYCHIATRY & NEUROLOGY

## 2020-05-18 PROCEDURE — 82607 VITAMIN B-12: CPT | Performed by: PSYCHIATRY & NEUROLOGY

## 2020-05-18 PROCEDURE — 81003 URINALYSIS AUTO W/O SCOPE: CPT

## 2020-05-18 PROCEDURE — 84443 ASSAY THYROID STIM HORMONE: CPT | Performed by: PSYCHIATRY & NEUROLOGY

## 2020-05-18 PROCEDURE — 36415 COLL VENOUS BLD VENIPUNCTURE: CPT | Performed by: PSYCHIATRY & NEUROLOGY

## 2020-05-18 PROCEDURE — 80053 COMPREHEN METABOLIC PANEL: CPT | Performed by: PSYCHIATRY & NEUROLOGY

## 2020-05-18 NOTE — PROGRESS NOTES
Subjective   Rei Silverman is a 69 y.o. male.     I performed this clinical encounter by utilizing a real-time telehealth video connection between my location and the patient's location at home.  I obtained verbal consent from the patient to perform this clinical encounter utilizing video and prepared the patient by answering any questions they had about the telehealth interaction.    CC: Dream enactment and memory loss    HPI: The patient is a 69-year-old right-handed male with history of Parkinson's disease, TIA, hyperlipidemia, BPH who presents today as an established patient for follow-up for his Parkinson's disease.  The patient was last seen as a new patient on February 19, 2020.  For details regarding his history please review that note.  An add-on visit was made today due to concerns about dreams.  Having increase in dreams.  When he gets up, feels that his sleep was restless.  This has been going on for 3-4 weeks.  Has increased daytime fatigue.  Describes dreams as vivid.  Is acting out dreams.  Has not hurt self.  Still has a bed partner.  The patient drinks 2-3 8 ounce cups of coffee a day.  They deny any alcohol.  He currently takes melatonin 8 mg.  He continues to have issues with daytime drooling which could produce his cough at night.  He is seeing speech therapy.  They also feel like his memory is a lot worse in the last month.       The following portions of the patient's history were reviewed and updated as appropriate: allergies, current medications, past family history, past medical history, past social history, past surgical history and problem list.    Review of Systems    Objective   Physical Exam   I did a [place] today.  He scored a 17 out of 28.      Assessment/Plan   Problems Addressed this Visit     None      Visit Diagnoses     Memory loss    -  Primary    Relevant Orders    CBC & Differential    Comprehensive Metabolic Panel    TSH Rfx On Abnormal To Free T4    Vitamin B12    POC  Urinalysis Dipstick, Automated        The patient is a 69-year-old right-handed male with history of Parkinson disease, TIA, hyperlipidemia, BPH who presents today for follow-up.    1.  REM sleep behavior disorder-I recommend for him to gradually cut back on his caffeine.  I also recommend increasing melatonin to 12 mg.  We also reviewed safety precautions.  If he continues to have bothersome symptoms we could consider clonazepam.    2.  Drooling-I would be hesitant to use the typical medications used for this issue as it may worsen his memory.  We discussed the role of Botox and I offered referral to Dr. Mcpherson or Dr. Wright.  They like to think about and get back with me.    3.  Memory loss-his [place] score is a little down today.  I would like to check some blood work for possible causes of delirium.  Depending on those results we may consider neuropsych testing or increasing his Exelon patch.    A total of 40 minutes of face-to-face time was spent with the patient and his wife and greater than 50% time was spent on counseling regarding his symptoms and plan of care.

## 2020-05-20 ENCOUNTER — TELEPHONE (OUTPATIENT)
Dept: NEUROLOGY | Facility: CLINIC | Age: 70
End: 2020-05-20

## 2020-05-20 DIAGNOSIS — R26.89 BALANCE PROBLEM: ICD-10-CM

## 2020-05-20 DIAGNOSIS — R41.3 MEMORY LOSS: Primary | ICD-10-CM

## 2020-05-20 RX ORDER — RIVASTIGMINE 13.3 MG/24H
1 PATCH, EXTENDED RELEASE TRANSDERMAL DAILY
Qty: 30 PATCH | Refills: 11 | Status: SHIPPED | OUTPATIENT
Start: 2020-05-20 | End: 2020-06-16 | Stop reason: SINTOL

## 2020-05-20 NOTE — TELEPHONE ENCOUNTER
PT'S WIFE CALLED BACK STATING SHE NEEDS THE NEUROPSYCHOLOGY ORDER SENT OVER TO DR. HARRY WELSH. THEIR PHONE NUMBER [phone]. SHE DID NOT PROVIDE A FAX

## 2020-05-20 NOTE — TELEPHONE ENCOUNTER
PT'S WIFE MARK CALLED REQUESTING TO INCREASE PT'S RIVASTIGMINE DOSE AS DISCUSSED AT APPT ON 5-18-20.      PHARMACY:  [facility]  [address]      PT'S WIFE ALSO STATED SHE IS GOING TO CONTACT NEUROPSYCHOLOGISTS FOR TESTING PURPOSES.

## 2020-05-22 ENCOUNTER — TELEPHONE (OUTPATIENT)
Dept: NEUROLOGY | Facility: CLINIC | Age: 70
End: 2020-05-22

## 2020-05-22 NOTE — TELEPHONE ENCOUNTER
MARK, PATIENT WIFE, CALLED TO ASK TO FAX THE PHYSICAL THERAPY REFERRAL TO [facility]    PHONE NUMBER [phone]

## 2020-05-27 ENCOUNTER — TELEPHONE (OUTPATIENT)
Dept: NEUROLOGY | Facility: CLINIC | Age: 70
End: 2020-05-27

## 2020-05-27 NOTE — TELEPHONE ENCOUNTER
Patients wife called back and stated that she is no longer needing the prescription for the sleep number bed at this time.

## 2020-05-27 NOTE — TELEPHONE ENCOUNTER
PT'S WIFE CALLED STATING ,  THAT SHE IS REQUESTING A SLEEP NUMBER BED SHE SAYS SHE NEEDS A PRESCRIPTION FROM THE DR  AND THAT SHE COULD USE THAT TO HELP GET A CLAIM FILED .  BEST NUMBER TO CALL [phone]

## 2020-06-01 ENCOUNTER — DOCUMENTATION (OUTPATIENT)
Dept: NEUROLOGY | Facility: CLINIC | Age: 70
End: 2020-06-01

## 2020-06-01 NOTE — PROGRESS NOTES
Received a physical therapy note from May 27, 2020.  It reports that he does not need formalized physical therapy intervention but community fitness was encouraged.

## 2020-06-15 DIAGNOSIS — G20 PARKINSON'S DISEASE (HCC): ICD-10-CM

## 2020-06-15 RX ORDER — CARBIDOPA, LEVODOPA AND ENTACAPONE 37.5; 200; 15 MG/1; MG/1; MG/1
1 TABLET, FILM COATED ORAL 3 TIMES DAILY
Qty: 270 TABLET | Refills: 2 | Status: SHIPPED | OUTPATIENT
Start: 2020-06-15 | End: 2021-03-05

## 2020-06-16 ENCOUNTER — OFFICE VISIT (OUTPATIENT)
Dept: FAMILY MEDICINE CLINIC | Facility: CLINIC | Age: 70
End: 2020-06-16

## 2020-06-16 VITALS
TEMPERATURE: 96.5 F | HEIGHT: 67 IN | WEIGHT: 152.4 LBS | SYSTOLIC BLOOD PRESSURE: 106 MMHG | BODY MASS INDEX: 23.92 KG/M2 | HEART RATE: 57 BPM | OXYGEN SATURATION: 99 % | DIASTOLIC BLOOD PRESSURE: 46 MMHG | RESPIRATION RATE: 16 BRPM

## 2020-06-16 DIAGNOSIS — E78.2 MIXED HYPERLIPIDEMIA: ICD-10-CM

## 2020-06-16 DIAGNOSIS — Z12.5 SCREENING FOR PROSTATE CANCER: ICD-10-CM

## 2020-06-16 DIAGNOSIS — Z00.00 MEDICARE ANNUAL WELLNESS VISIT, SUBSEQUENT: Primary | ICD-10-CM

## 2020-06-16 PROCEDURE — G0439 PPPS, SUBSEQ VISIT: HCPCS | Performed by: FAMILY MEDICINE

## 2020-06-16 RX ORDER — LANOLIN ALCOHOL/MO/W.PET/CERES
1000 CREAM (GRAM) TOPICAL DAILY
COMMUNITY
End: 2021-07-30 | Stop reason: ALTCHOICE

## 2020-06-16 RX ORDER — RIVASTIGMINE 9.5 MG/24H
1 PATCH, EXTENDED RELEASE TRANSDERMAL DAILY
COMMUNITY
End: 2020-09-04 | Stop reason: SDUPTHER
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
## 2020-06-17 LAB
CHOLEST SERPL-MCNC: 199 MG/DL (ref 0–200)
HDLC SERPL-MCNC: 63 MG/DL (ref 40–60)
LDLC SERPL CALC-MCNC: 115 MG/DL (ref 0–100)
PSA SERPL-MCNC: 0.16 NG/ML (ref 0–4)
TRIGL SERPL-MCNC: 103 MG/DL (ref 0–150)
VLDLC SERPL CALC-MCNC: 20.6 MG/DL

## 2020-06-26 RX ORDER — ATORVASTATIN CALCIUM 40 MG/1
TABLET, FILM COATED ORAL
Qty: 90 TABLET | Refills: 1 | Status: SHIPPED | OUTPATIENT
Start: 2020-06-26 | End: 2020-08-19 | Stop reason: SINTOL

## 2020-07-06 ENCOUNTER — TELEPHONE (OUTPATIENT)
Dept: NEUROLOGY | Facility: CLINIC | Age: 70
End: 2020-07-06

## 2020-07-06 RX ORDER — CLOPIDOGREL BISULFATE 75 MG/1
75 TABLET ORAL DAILY
Qty: 90 TABLET | Refills: 3 | Status: SHIPPED | OUTPATIENT
Start: 2020-07-06 | End: 2022-08-25

## 2020-07-06 NOTE — TELEPHONE ENCOUNTER
Provider: DR. ANGELES  Caller: MARK JAMES  Relationship to Patient: PT'S WIFE      Reason for Call: PT'S WIFE CALLING TO LET DR. POWER KNOW THAT THEY HAVE ALTERNATE THE rivastigmine (EXELON) patch DAILY FROM 13.5 PATCH TO THE 9.5MG PATCH FOR 5-6 DAYS. THEY ARE STICKING WITH THE 9.5MG PATCH AND WILL BE SEEING THE NEURO PSYCHOLOGIST AT THE END OF THE MONTH. IF YOU HAVE ANY QUESTIONS PLEASE CALL HER AT  [phone]

## 2020-07-31 DIAGNOSIS — R26.89 BALANCE PROBLEM: ICD-10-CM

## 2020-07-31 DIAGNOSIS — G20 PARKINSON'S DISEASE (HCC): Primary | ICD-10-CM

## 2020-08-19 ENCOUNTER — OFFICE VISIT (OUTPATIENT)
Dept: NEUROLOGY | Facility: CLINIC | Age: 70
End: 2020-08-19

## 2020-08-19 VITALS
HEIGHT: 66 IN | OXYGEN SATURATION: 99 % | WEIGHT: 151 LBS | HEART RATE: 60 BPM | BODY MASS INDEX: 24.27 KG/M2 | SYSTOLIC BLOOD PRESSURE: 112 MMHG | DIASTOLIC BLOOD PRESSURE: 70 MMHG

## 2020-08-19 DIAGNOSIS — G20 PARKINSON'S DISEASE (HCC): Primary | ICD-10-CM

## 2020-08-19 DIAGNOSIS — K11.7 DROOLING: ICD-10-CM

## 2020-08-19 PROCEDURE — 99214 OFFICE O/P EST MOD 30 MIN: CPT | Performed by: PSYCHIATRY & NEUROLOGY

## 2020-08-19 RX ORDER — MULTIVIT WITH MINERALS/LUTEIN
400 TABLET ORAL DAILY
COMMUNITY
End: 2023-02-22

## 2020-08-19 NOTE — PROGRESS NOTES
Subjective:     Patient ID: Rei Silverman is a 69 y.o. male.    Mr. Silverman is a 69-year-old right-handed male with history of Parkinson's disease, TIA, hyperlipidemia, and BPH who presents to the neurology clinic today as a follow-up for his Parkinson's disease.  The patient was last seen on May 18, 2020.  He was seen as a new patient on February 19, 2020.  For details regarding his history please review his initial consultation.  We added him on back in May due to concerns about his dreams.  He was having increasing in dreams, restless sleep, and acting out his dreams.  He also reports some issues with daytime drooling and concern for his memory.  For his REM sleep behavior disorder I recommend for the patient to gradually cut back on his caffeine.  He has cut it in half.  He is drinking about 1-1/2 cups a day.  He does more talking than anything.  They feel like his dream enactment is about the same.  He thrashes about once per month.  He is currently on melatonin 10 mg daily without side effects.  We did talk about Botox but at his last visit he was not interested for that for his drooling.  He is having a lot of coughing at night which they are not sure if this is related to the drooling or not.  Due to concerns for memory loss we increased his Exelon patch.  Unfortunately on the higher dose he felt drunk and therefore they went back to the lower dose.  I check blood work including a CBC, CMP, TSH and B12 and everything was normal.  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    The following portions of the patient's history were reviewed and updated as appropriate: allergies, current medications, past family history, past medical history, past social history, past surgical history and problem list.    Review of Systems   Respiratory: Negative for cough, chest tightness and shortness of breath.    Cardiovascular:  Negative for chest pain, palpitations and leg swelling.        Objective:    Neurologic Exam    Physical Exam     The patient has a resting tremor in his right upper extremity.    Assessment/Plan:    The patient is a 69-year-old right-handed male with history of Parkinson disease, TIA, hyper lipidemia, and BPH who presents today for follow-up.    1.  Parkinson's disease-The patient is on a complicated medication regimen including Stalevo, Azilect, and Neupro.  We will continue his doses for now with no changes.  They do not want to increase his melatonin or try clonazepam for his REM sleep behavior disorder.  They would like to stick with the lower dose of his Exelon patch.  When it comes to the coughing at night this could be related to GERD or nasal congestion.  It may also be related to his drooling.  After a long discussion of his options, we decided that it would be a good idea to do a consultation with Dr. Mcpherson over at [facility].  We can have him take a look at his medications to make sure he is on an optimal regimen.  The patient can also discuss with Dr. Mcpherson possible Botox.  He can continue to follow-up with Dr. Mcpherson or I am happy to see him after his consultation after that.    A total of 25 minutes of face-to-face time was spent with the patient his wife and greater than 50% time was spent on counseling regarding his symptoms and plan of care.       Problems Addressed this Visit        Nervous and Auditory    Parkinson's disease (CMS/HCC) - Primary    Relevant Orders    Ambulatory Referral to Neurology (Completed)      Other Visit Diagnoses     Drooling        Relevant Orders    Ambulatory Referral to Neurology (Completed)

## 2020-08-19 NOTE — PATIENT INSTRUCTIONS
• If you have heart burn or indigestion at night: avoid eating close to bedtime; elevated your head of bed; avoid spicy foods, tomatoes, citrus, alcohol, caffeine, and mint at night; take your antacid at night; sleep on your left side.   • If you have nasal stuffiness or congestion: consider taking an over the counter allergy medicine such as zyrtec, claritin, or allegra at night as well as a nasal spray such as Flonase or Nasacort.

## 2020-08-25 DIAGNOSIS — I63.10 CEREBROVASCULAR ACCIDENT (CVA) DUE TO EMBOLISM OF PRECEREBRAL ARTERY (HCC): ICD-10-CM

## 2020-08-25 RX ORDER — RASAGILINE 1 MG/1
1 TABLET ORAL DAILY
Qty: 90 TABLET | Refills: 2 | Status: SHIPPED | OUTPATIENT
Start: 2020-08-25

## 2020-09-02 DIAGNOSIS — N40.0 BENIGN NON-NODULAR PROSTATIC HYPERPLASIA WITHOUT LOWER URINARY TRACT SYMPTOMS: ICD-10-CM

## 2020-09-03 RX ORDER — FINASTERIDE 5 MG/1
TABLET, FILM COATED ORAL
Qty: 90 TABLET | Refills: 1 | Status: SHIPPED | OUTPATIENT
Start: 2020-09-03 | End: 2021-03-01

## 2020-09-04 ENCOUNTER — TELEPHONE (OUTPATIENT)
Dept: NEUROLOGY | Facility: CLINIC | Age: 70
End: 2020-09-04

## 2020-09-04 RX ORDER — RIVASTIGMINE 9.5 MG/24H
1 PATCH, EXTENDED RELEASE TRANSDERMAL DAILY
Qty: 30 PATCH | Refills: 5 | Status: SHIPPED | OUTPATIENT
Start: 2020-09-04

## 2020-09-04 NOTE — TELEPHONE ENCOUNTER
[facility] CALLED  AND STATED THAT IT HAS TO HAVE THE WORDS  EXELON ON  IT THEY CANT SWITCH A GENERIC FOR A NAME BRAND THEY WOULD LIKE ANOTHER SENT OVER . TO THEM . THANK YOU .

## 2020-09-04 NOTE — TELEPHONE ENCOUNTER
Received call from patients wife Shanique who stated she went to  [facility]ints Rivastigmine and pharmacy advised that her insurance will only cover the Main drug patch and not the generic any longer and that they need a new prescription for the Main drug not the generic.     Can you please call into the pharmacy and let Shanique know what is going on as well?     Shanique   [phone]    Thank you

## 2020-09-14 DIAGNOSIS — N40.0 BENIGN PROSTATIC HYPERPLASIA WITHOUT LOWER URINARY TRACT SYMPTOMS: ICD-10-CM

## 2020-09-15 RX ORDER — TAMSULOSIN HYDROCHLORIDE 0.4 MG/1
CAPSULE ORAL
Qty: 90 CAPSULE | Refills: 1 | Status: SHIPPED | OUTPATIENT
Start: 2020-09-15 | End: 2021-03-04

## 2020-09-21 RX ORDER — ROTIGOTINE 2 MG/24H
1 PATCH, EXTENDED RELEASE TRANSDERMAL DAILY
Qty: 90 PATCH | Refills: 3 | Status: SHIPPED | OUTPATIENT
Start: 2020-09-21 | End: 2022-05-13

## 2020-09-23 ENCOUNTER — TELEPHONE (OUTPATIENT)
Dept: NEUROLOGY | Facility: CLINIC | Age: 70
End: 2020-09-23

## 2020-09-23 NOTE — TELEPHONE ENCOUNTER
PT'S WIFE MARK HARNED CALLING TO SEE IF OFFICE HAS SAMPLE OF THE NEUPRO 2MG, CAUSE THE [facility] WON'T HAVE THE PATCH UNTIL 5PM TOMORROW. PLEASE CALL HER BACK [phone]

## 2021-03-01 DIAGNOSIS — N40.0 BENIGN NON-NODULAR PROSTATIC HYPERPLASIA WITHOUT LOWER URINARY TRACT SYMPTOMS: ICD-10-CM

## 2021-03-01 RX ORDER — FINASTERIDE 5 MG/1
TABLET, FILM COATED ORAL
Qty: 90 TABLET | Refills: 1 | Status: SHIPPED | OUTPATIENT
Start: 2021-03-01 | End: 2021-08-28

## 2021-03-02 DIAGNOSIS — N40.0 BENIGN PROSTATIC HYPERPLASIA WITHOUT LOWER URINARY TRACT SYMPTOMS: ICD-10-CM

## 2021-03-04 RX ORDER — TAMSULOSIN HYDROCHLORIDE 0.4 MG/1
CAPSULE ORAL
Qty: 90 CAPSULE | Refills: 1 | Status: SHIPPED | OUTPATIENT
Start: 2021-03-04 | End: 2021-09-21

## 2021-03-05 DIAGNOSIS — G20 PARKINSON'S DISEASE (HCC): ICD-10-CM

## 2021-03-05 RX ORDER — CARBIDOPA, LEVODOPA AND ENTACAPONE 37.5; 200; 15 MG/1; MG/1; MG/1
TABLET, FILM COATED ORAL
Qty: 270 TABLET | Refills: 3 | Status: SHIPPED | OUTPATIENT
Start: 2021-03-05

## 2021-03-09 DIAGNOSIS — Z23 IMMUNIZATION DUE: ICD-10-CM

## 2021-06-24 PROBLEM — F02.80 DEMENTIA DUE TO PARKINSON'S DISEASE WITHOUT BEHAVIORAL DISTURBANCE: Status: ACTIVE | Noted: 2021-01-18

## 2021-06-24 PROBLEM — K11.7 SIALORRHEA: Status: ACTIVE | Noted: 2021-01-18

## 2021-06-24 PROBLEM — H53.2 DIPLOPIA: Status: ACTIVE | Noted: 2021-01-18

## 2021-06-24 PROBLEM — G47.52 RBD (REM BEHAVIORAL DISORDER): Status: ACTIVE | Noted: 2021-01-18

## 2021-06-24 PROBLEM — G20.A1 DEMENTIA DUE TO PARKINSON'S DISEASE WITHOUT BEHAVIORAL DISTURBANCE: Status: ACTIVE | Noted: 2021-01-18

## 2021-06-24 PROBLEM — G20 DEMENTIA DUE TO PARKINSON'S DISEASE WITHOUT BEHAVIORAL DISTURBANCE (HCC): Status: ACTIVE | Noted: 2021-01-18

## 2021-06-30 ENCOUNTER — TELEPHONE (OUTPATIENT)
Dept: CARDIOLOGY | Facility: CLINIC | Age: 71
End: 2021-06-30

## 2021-07-30 ENCOUNTER — OFFICE VISIT (OUTPATIENT)
Dept: FAMILY MEDICINE CLINIC | Facility: CLINIC | Age: 71
End: 2021-07-30

## 2021-07-30 VITALS
SYSTOLIC BLOOD PRESSURE: 134 MMHG | HEIGHT: 67 IN | TEMPERATURE: 97.1 F | BODY MASS INDEX: 23.78 KG/M2 | HEART RATE: 58 BPM | OXYGEN SATURATION: 98 % | DIASTOLIC BLOOD PRESSURE: 78 MMHG | RESPIRATION RATE: 15 BRPM | WEIGHT: 151.5 LBS

## 2021-07-30 DIAGNOSIS — E78.2 MIXED HYPERLIPIDEMIA: ICD-10-CM

## 2021-07-30 DIAGNOSIS — Z00.00 MEDICARE ANNUAL WELLNESS VISIT, SUBSEQUENT: Primary | ICD-10-CM

## 2021-07-30 DIAGNOSIS — N40.0 BENIGN PROSTATIC HYPERPLASIA WITHOUT LOWER URINARY TRACT SYMPTOMS: ICD-10-CM

## 2021-07-30 PROCEDURE — G0439 PPPS, SUBSEQ VISIT: HCPCS | Performed by: FAMILY MEDICINE

## 2021-07-30 PROCEDURE — 99213 OFFICE O/P EST LOW 20 MIN: CPT | Performed by: FAMILY MEDICINE

## 2021-07-31 LAB
ALBUMIN SERPL-MCNC: 4.5 G/DL (ref 3.5–5.2)
ALBUMIN/GLOB SERPL: 1.6 G/DL
ALP SERPL-CCNC: 71 U/L (ref 39–117)
ALT SERPL-CCNC: 15 U/L (ref 1–41)
AST SERPL-CCNC: 26 U/L (ref 1–40)
BASOPHILS # BLD AUTO: 0.05 10*3/MM3 (ref 0–0.2)
BASOPHILS NFR BLD AUTO: 0.9 % (ref 0–1.5)
BILIRUB SERPL-MCNC: 0.2 MG/DL (ref 0–1.2)
BUN SERPL-MCNC: 18 MG/DL (ref 8–23)
BUN/CREAT SERPL: 14.9 (ref 7–25)
CALCIUM SERPL-MCNC: 9.6 MG/DL (ref 8.6–10.5)
CHLORIDE SERPL-SCNC: 102 MMOL/L (ref 98–107)
CHOLEST SERPL-MCNC: 216 MG/DL (ref 0–200)
CO2 SERPL-SCNC: 27 MMOL/L (ref 22–29)
CREAT SERPL-MCNC: 1.21 MG/DL (ref 0.76–1.27)
EOSINOPHIL # BLD AUTO: 0.16 10*3/MM3 (ref 0–0.4)
EOSINOPHIL NFR BLD AUTO: 2.8 % (ref 0.3–6.2)
ERYTHROCYTE [DISTWIDTH] IN BLOOD BY AUTOMATED COUNT: 12.4 % (ref 12.3–15.4)
GLOBULIN SER CALC-MCNC: 2.9 GM/DL
GLUCOSE SERPL-MCNC: 104 MG/DL (ref 65–99)
HCT VFR BLD AUTO: 41.1 % (ref 37.5–51)
HDLC SERPL-MCNC: 67 MG/DL (ref 40–60)
HGB BLD-MCNC: 14.2 G/DL (ref 13–17.7)
IMM GRANULOCYTES # BLD AUTO: 0.01 10*3/MM3 (ref 0–0.05)
IMM GRANULOCYTES NFR BLD AUTO: 0.2 % (ref 0–0.5)
LDLC SERPL CALC-MCNC: 121 MG/DL (ref 0–100)
LYMPHOCYTES # BLD AUTO: 1.6 10*3/MM3 (ref 0.7–3.1)
LYMPHOCYTES NFR BLD AUTO: 28.3 % (ref 19.6–45.3)
MCH RBC QN AUTO: 34 PG (ref 26.6–33)
MCHC RBC AUTO-ENTMCNC: 34.5 G/DL (ref 31.5–35.7)
MCV RBC AUTO: 98.3 FL (ref 79–97)
MONOCYTES # BLD AUTO: 0.64 10*3/MM3 (ref 0.1–0.9)
MONOCYTES NFR BLD AUTO: 11.3 % (ref 5–12)
NEUTROPHILS # BLD AUTO: 3.2 10*3/MM3 (ref 1.7–7)
NEUTROPHILS NFR BLD AUTO: 56.5 % (ref 42.7–76)
NRBC BLD AUTO-RTO: 0 /100 WBC (ref 0–0.2)
PLATELET # BLD AUTO: 360 10*3/MM3 (ref 140–450)
POTASSIUM SERPL-SCNC: 5.2 MMOL/L (ref 3.5–5.2)
PROT SERPL-MCNC: 7.4 G/DL (ref 6–8.5)
RBC # BLD AUTO: 4.18 10*6/MM3 (ref 4.14–5.8)
SODIUM SERPL-SCNC: 141 MMOL/L (ref 136–145)
TRIGL SERPL-MCNC: 158 MG/DL (ref 0–150)
VLDLC SERPL CALC-MCNC: 28 MG/DL (ref 5–40)
WBC # BLD AUTO: 5.66 10*3/MM3 (ref 3.4–10.8)

## 2021-08-04 ENCOUNTER — TELEPHONE (OUTPATIENT)
Dept: FAMILY MEDICINE CLINIC | Facility: CLINIC | Age: 71
End: 2021-08-04

## 2021-08-04 NOTE — TELEPHONE ENCOUNTER
PATIENT WIFE CALLING IN REGARDS TO REPORT TO DR NAVA THAT HIS BLOOD PRESSURE HAS BEEN RUNNING LOW BETWEEN 90'S- AND 'S. SHE REPORTS IT HAS BEEN LOW TO 69. PLEASE ADVISE, THANK YOU!

## 2021-08-05 NOTE — TELEPHONE ENCOUNTER
Spoke with the wife in detail. The nurse called wife for BP readings instead of ranges since he is all over the place on his readings. Pt's wife states that his BP this am was 93/56, last night was 136/85. She gave this nurse some readings. She starts in the am and will repeat according to how the patient is feeling. Some readings are as follows: 128/72, 104/56, 101/57, 121/77, 83/47, 68/52 (wife gave patient salt water to raise his BP which it went to 113 on the top and was not advised on the diastolic read, 95/60, 104/62, 113/69, 98/56, 119/69, 96.59. Wife states Dr. Mcpherson office called in for this patient midodrine. Wife has not been able to pick this up yet and will not start until probably Saturday due to her having a colonoscopy tomorrow. She states will not be able to monitor him. Pt complains of foggyness when BP is low. Wife wanted to let kendall now that they are stopping liquids several hours before bed now. He is getting up less at night but still gets up about 2 times. Please advise.

## 2021-08-06 NOTE — TELEPHONE ENCOUNTER
"Sounds like he gets \"foggy\" are there any other symptoms? Make sure no tunnel vision like going to pass out or passing out, lightheadedness.  We talked about low blood pressure being symptomatic and that normal blood pressure for healthy people can run low and there are no symptoms. Sounds like he is having at least the foggy symptom.   I think the midodrine is a good call if he is consistently low or dropping and having symptoms.   In the meantime, staying well hydrated with water throughout the day, can be liberal with salt, do not increase the tamsulosin until things settle down.   "

## 2021-08-06 NOTE — TELEPHONE ENCOUNTER
Called patient's wife back and unable to speak with her. Asked her to call this nurse back. Will reach out on Monday again.

## 2021-08-10 NOTE — TELEPHONE ENCOUNTER
Spoke with wife in detail. Wife states that he only has fatigue  and occasional dizziness. Started midodrine on 8/3/21. Wife taking BP daily.

## 2021-08-27 DIAGNOSIS — N40.0 BENIGN NON-NODULAR PROSTATIC HYPERPLASIA WITHOUT LOWER URINARY TRACT SYMPTOMS: ICD-10-CM

## 2021-08-27 NOTE — TELEPHONE ENCOUNTER
Rx Refill Note  Requested Prescriptions     Pending Prescriptions Disp Refills   • finasteride (PROSCAR) 5 MG tablet [Pharmacy Med Name: FINASTERIDE 5 MG TABLET] 90 tablet 1     Sig: TAKE 1 TABLET BY MOUTH EVERY DAY      Last office visit with prescribing clinician: 7/30/2021      Next office visit with prescribing clinician: 8/1/2022            Sveta Garcia LPN  08/27/21, 14:39 EDT

## 2021-08-28 RX ORDER — FINASTERIDE 5 MG/1
TABLET, FILM COATED ORAL
Qty: 90 TABLET | Refills: 1 | Status: SHIPPED | OUTPATIENT
Start: 2021-08-28 | End: 2022-02-19

## 2021-09-13 ENCOUNTER — TELEPHONE (OUTPATIENT)
Dept: FAMILY MEDICINE CLINIC | Facility: CLINIC | Age: 71
End: 2021-09-13

## 2021-09-13 DIAGNOSIS — N40.0 BENIGN PROSTATIC HYPERPLASIA WITHOUT LOWER URINARY TRACT SYMPTOMS: ICD-10-CM

## 2021-09-13 NOTE — TELEPHONE ENCOUNTER
Rx Refill Note  Requested Prescriptions     Pending Prescriptions Disp Refills   • tamsulosin (FLOMAX) 0.4 MG capsule 24 hr capsule [Pharmacy Med Name: TAMSULOSIN HCL 0.4 MG CAPSULE] 90 capsule 1     Sig: TAKE 1 CAPSULE BY MOUTH EVERY DAY      Last office visit with prescribing clinician: 7/30/2021      Next office visit with prescribing clinician: 9/13/2021            Sveta Garcia LPN  09/13/21, 15:49 EDT

## 2021-09-13 NOTE — TELEPHONE ENCOUNTER
A user error has taken place: encounter opened in error, closed for administrative reasons.   Full range of motion of upper and lower extremities, no joint tenderness/swelling.

## 2021-09-14 RX ORDER — TAMSULOSIN HYDROCHLORIDE 0.4 MG/1
CAPSULE ORAL
Qty: 90 CAPSULE | Refills: 1 | OUTPATIENT
Start: 2021-09-14

## 2021-09-14 NOTE — TELEPHONE ENCOUNTER
I called this pt to see if he needed this medication. I don't think he was going to start it related to the low blood pressures he was having and needing medication for but wanted to verify. If he calls please ask if he is taking this medication and I can refill if so. Thanks.

## 2021-09-20 ENCOUNTER — TELEPHONE (OUTPATIENT)
Dept: FAMILY MEDICINE CLINIC | Facility: CLINIC | Age: 71
End: 2021-09-20

## 2021-09-20 DIAGNOSIS — N40.0 BENIGN PROSTATIC HYPERPLASIA WITHOUT LOWER URINARY TRACT SYMPTOMS: ICD-10-CM

## 2021-09-20 NOTE — TELEPHONE ENCOUNTER
Caller: Shanique Silverman    Relationship: Emergency Contact    Best call back [phone]    Who are you requesting to speak with (clinical staff, provider,  specific staff member): JOSUE      What was the call regarding: PATIENT'S WIFE IS CALLING WITH ADDITIONAL INFORMATION FOR MEDICATION MIDODRINE 5 MG AND 1/2  -- 3 X A DAY    [facility]/pharmacy #[id number] - [place], [place] - [address] - [phone] [facility] [phone]   [phone]

## 2021-09-21 RX ORDER — TAMSULOSIN HYDROCHLORIDE 0.4 MG/1
CAPSULE ORAL
Qty: 90 CAPSULE | Refills: 1 | Status: SHIPPED | OUTPATIENT
Start: 2021-09-21 | End: 2022-03-22

## 2021-09-21 NOTE — TELEPHONE ENCOUNTER
Spoke with the wife and she states that patient is taking this medication. Was taking this daily. Talked about increasing dose is what wife stated but due to BP readings she feels need to wait. Have never stopped this med. Please send to pharmacy. BP readings to follow.

## 2021-10-27 RX ORDER — ROTIGOTINE 2 MG/24H
1 PATCH, EXTENDED RELEASE TRANSDERMAL DAILY
Qty: 90 PATCH | Refills: 3 | OUTPATIENT
Start: 2021-10-27

## 2021-10-27 NOTE — TELEPHONE ENCOUNTER
Does he need to get this from Dr Mcpherson ?  He has not seen you since Aug 2020 and has no upcoming appts

## 2022-01-01 ENCOUNTER — TELEPHONE (OUTPATIENT)
Dept: FAMILY MEDICINE CLINIC | Facility: CLINIC | Age: 72
End: 2022-01-01

## 2022-02-18 DIAGNOSIS — N40.0 BENIGN NON-NODULAR PROSTATIC HYPERPLASIA WITHOUT LOWER URINARY TRACT SYMPTOMS: ICD-10-CM

## 2022-02-18 NOTE — TELEPHONE ENCOUNTER
Rx Refill Note  Requested Prescriptions     Pending Prescriptions Disp Refills   • finasteride (PROSCAR) 5 MG tablet [Pharmacy Med Name: FINASTERIDE 5 MG TABLET] 90 tablet 1     Sig: TAKE 1 TABLET BY MOUTH EVERY DAY      Last office visit with prescribing clinician: 7/30/2021      Next office visit with prescribing clinician: 8/1/2022            Sveta Garcia LPN  02/18/22, 08:27 EST

## 2022-02-19 RX ORDER — FINASTERIDE 5 MG/1
TABLET, FILM COATED ORAL
Qty: 90 TABLET | Refills: 1 | Status: SHIPPED | OUTPATIENT
Start: 2022-02-19 | End: 2022-06-09

## 2022-03-20 DIAGNOSIS — N40.0 BENIGN PROSTATIC HYPERPLASIA WITHOUT LOWER URINARY TRACT SYMPTOMS: ICD-10-CM

## 2022-03-21 NOTE — TELEPHONE ENCOUNTER
Rx Refill Note  Requested Prescriptions     Pending Prescriptions Disp Refills   • tamsulosin (FLOMAX) 0.4 MG capsule 24 hr capsule [Pharmacy Med Name: TAMSULOSIN HCL 0.4 MG CAPSULE] 90 capsule 1     Sig: TAKE 1 CAPSULE BY MOUTH EVERY DAY      Last office visit with prescribing clinician: 7/30/2021      Next office visit with prescribing clinician: 8/1/2022            Sveta Garcia LPN  03/21/22, 13:47 EDT

## 2022-03-22 RX ORDER — TAMSULOSIN HYDROCHLORIDE 0.4 MG/1
CAPSULE ORAL
Qty: 90 CAPSULE | Refills: 1 | Status: SHIPPED | OUTPATIENT
Start: 2022-03-22 | End: 2022-05-13 | Stop reason: SINTOL

## 2022-05-13 ENCOUNTER — OFFICE VISIT (OUTPATIENT)
Dept: FAMILY MEDICINE CLINIC | Facility: CLINIC | Age: 72
End: 2022-05-13

## 2022-05-13 VITALS
HEIGHT: 67 IN | TEMPERATURE: 97.8 F | HEART RATE: 63 BPM | OXYGEN SATURATION: 92 % | SYSTOLIC BLOOD PRESSURE: 164 MMHG | DIASTOLIC BLOOD PRESSURE: 92 MMHG | WEIGHT: 155.8 LBS | BODY MASS INDEX: 24.45 KG/M2 | RESPIRATION RATE: 15 BRPM

## 2022-05-13 DIAGNOSIS — I95.1 ORTHOSTATIC HYPOTENSION: Primary | ICD-10-CM

## 2022-05-13 DIAGNOSIS — I10 ESSENTIAL (PRIMARY) HYPERTENSION: ICD-10-CM

## 2022-05-13 DIAGNOSIS — R03.0 ELEVATED BLOOD PRESSURE READING: ICD-10-CM

## 2022-05-13 PROBLEM — R29.6 FREQUENT FALLS: Status: ACTIVE | Noted: 2022-04-20

## 2022-05-13 PROCEDURE — 1111F DSCHRG MED/CURRENT MED MERGE: CPT | Performed by: FAMILY MEDICINE

## 2022-05-13 PROCEDURE — 99496 TRANSJ CARE MGMT HIGH F2F 7D: CPT | Performed by: FAMILY MEDICINE

## 2022-05-13 RX ORDER — THIAMINE HCL 100 MG
400 TABLET ORAL DAILY
COMMUNITY

## 2022-05-13 RX ORDER — LANOLIN ALCOHOL/MO/W.PET/CERES
200 CREAM (GRAM) TOPICAL
COMMUNITY
Start: 2022-05-06 | End: 2022-07-18 | Stop reason: ALTCHOICE

## 2022-05-13 RX ORDER — MIDODRINE HYDROCHLORIDE 10 MG/1
10 TABLET ORAL 3 TIMES DAILY
COMMUNITY
Start: 2022-01-18 | End: 2023-01-05 | Stop reason: SDUPTHER

## 2022-05-13 RX ORDER — CHLORAL HYDRATE 500 MG
CAPSULE ORAL
COMMUNITY
End: 2022-05-26 | Stop reason: DRUGHIGH

## 2022-05-13 NOTE — PROGRESS NOTES
Chief Complaint   Patient presents with   • Hospital Follow Up Visit     Falls, d/c'd from [facility] this passed Sat       Subjective   Rei Silverman is a 71 y.o. male.     History of Present Illness   BP gets so low.   He has dementia and trouble with his memory so wife helps answer a lot of questions. Pt does not recall situation surrounding syncopal episode.   Also does not recall what he feels like if he feels bad when bp is low. Wife says he looks glassy eyed and he is weak and tired.   Lies down drinks the salt water takes the midodrine.   He gets hypotensive with activity like his exercise. Also more likely to be hypotensive as the day goes on.   Had really counted on his PD exercising to keep his movement good but his BP drops with the exercise.   No longer with his cardiologist, moved from [facility]. Says they have contacted but not heard back from him.     He was admitted to the hospital 4-20 and discharged 4-27.  He then went to inpatient rehab at [facility] where he did really well.  He was discharged from there 6 days ago.  Patient's wife describes that patient was in the other room and heard him fall hard and when she arrived it look like he had fallen forward onto his face.  It seems as though he passed out.  Patient does not recall the episode but he did have loss of consciousness.  He also had dark red blood from his mouth when he was found on the floor after falling.  He was admitted and imaged and evaluated by neurosurgery.  He did have facial fracture.  Fall was thought to be likely related to his Parkinson's disease and/or orthostasis.  In the hospital he did not need a midodrine as often but discharged on his midodrine 3 times daily and to take as needed with orthostasis.  He has low blood pressures with physical therapy and activity and so is supposed to be monitoring for this.  He try to get in with his cardiologist but has been unable to get through.  Would be willing to work with another  "cardiologist that would be inside the [facility] network.    The following portions of the patient's history were reviewed and updated as appropriate: allergies, current medications, past family history, past medical history, past social history, past surgical history and problem list.    Review of Systems    /92 (BP Location: Right arm, Patient Position: Sitting, Cuff Size: Small Adult)   Pulse 63   Temp 97.8 °F (36.6 °C) (Temporal)   Resp 15   Ht 168.9 cm (66.5\")   Wt 70.7 kg (155 lb 12.8 oz)   SpO2 92%   BMI 24.77 kg/m²       Objective   Physical Exam  Vitals reviewed.   Constitutional:       General: He is not in acute distress.  Eyes:      General: No scleral icterus.        Right eye: No discharge.         Left eye: No discharge.      Conjunctiva/sclera: Conjunctivae normal.   Cardiovascular:      Rate and Rhythm: Normal rate and regular rhythm.      Heart sounds: Normal heart sounds. No murmur heard.  Pulmonary:      Effort: Pulmonary effort is normal. No respiratory distress.      Breath sounds: Normal breath sounds. No wheezing.   Musculoskeletal:      Cervical back: Neck supple. No muscular tenderness.      Right lower leg: No edema.      Left lower leg: No edema.   Lymphadenopathy:      Cervical: No cervical adenopathy.   Neurological:      Mental Status: He is oriented to person, place, and time.      Motor: No abnormal muscle tone.   Psychiatric:         Behavior: Behavior normal.         Assessment & Plan   Diagnoses and all orders for this visit:    1. Orthostatic hypotension (Primary)  -     Ambulatory Referral to Cardiology  -     TSH    2. Elevated blood pressure reading  -     TSH    3. Essential (primary) hypertension   -     TSH      We made a plan for trial through the weekend.   He is going to stop the tamsulosin because started it around the time he began having more trouble with his blood pressure dropping. Hoping it will bring it up especially with him staying on the midodrine. "   He has drop in BP with his exercise and more activity. Seems to do better when he is resting and not active or when he is lying down.   Also seems that his BP just gets lower and lower as the day goes on despite level of activity. Likely driven by the PD and potentially meds for PD. He was on other medication for the PD that dropped BP more significantly. rasagiline causes hypotension as does the rivastigmine  He is going to continue with the salt as needed but we won't change anything else.   If the BP is still dropping when they call in with update of BPs off the tamsulosin then will add back the compression socks and binder.   Going to get him to cardiology. He is on max dose and dosing of the midodrine though often his BP does not warrant midodrine early in the day and it is appropriately held.   May be candidate for fludrocortisone.   Will get him to cardiology. Need cards help as well for management.  Trying to take him on a trip with the family in June but wondering if that will be safe with his symptoms.     Will also have to see how the urinary symptoms are without the tamsulosin. He is getting up on average 3 times per night since on the tamsulosin. Was about twice as often prior to the addition of this medication. Wife has to get up and go with him to guide him. He is not lightheaded but gets confused on where to go. Then when time to sit down to urinate he freezes and needs help guided down. He does drink about 2 full glasses of water in the evening to get his night time medications down and sips water through the night for dry mouth.     He takes the carbidopa levodopa at 8 am 3 pm and 8 pm  He takes the rasagiline at 8 pm and changes the rivastigmine patch out at 8 pm.   He is on finasteride.     I reviewed the procedure notes, notes, reports, labs, and imaging studies from pt's recent admission.   Current outpatient and discharge medications have been reconciled for the patient.  Reviewed by: Mary  SHEILA Fernandez MD

## 2022-05-14 LAB — TSH SERPL DL<=0.005 MIU/L-ACNC: 1.68 UIU/ML (ref 0.45–4.5)

## 2022-05-16 NOTE — TELEPHONE ENCOUNTER
Spoke with wife. She said was just sitting with him to take the 5 pm BP to see if he needed midodrine and his SBP was 65 so she gave it to him and now he is drinking salt water. Plan to do binder and compression socks and stay off the tamsulosin.   He did have procedure on his face today with derm. Said that took a lot out of him.   Going to give me updates on his pressures if things change. Going to have him see cardiology.

## 2022-05-16 NOTE — TELEPHONE ENCOUNTER
PATIENTS WIFE MARK STATES LAST Friday WHEN THEY SAW DR. JACKSON SHE REQUESTED MARK TO TAKE PATIENTS BP AFTER HE DOES HIS DAILY EXERCISE. SHE IS PROVIDING SOME OFTHOSE RECORDINGS FOR ME TO LIST.     SAT 5/14 830 AM     RESTING> BP= 147/83 HR=58  10 MINS AFTER EXERCISE> BP=85/61 HR=85 (FEELING DIZZY)    SUN 5/15 8AM  RESTING> TO=069/101 HR=65  LUNCH> TY=299/90 HR=60    MON 5/16 730 AM  RESTING> EV=948/90 HR=64  LUNCH> LW=648/100 BEFORE MIDODRINE AND DIDN'T TAKE IT DUE TO BP BEING SO HIGH, WHEN SHE CHECKS HIS 5PM BP IF IT IS LOW SHE WILL GIVE HIM THE MED.     MARK IS MAILING THE REST OF THE BP RESULTS TO OUR OFFICE. MARK JUST WANTED YOU TO KNOW WHAT IS GOING ON    MARK> [phone]

## 2022-05-19 NOTE — TELEPHONE ENCOUNTER
Spoke with wife in detail. Voiced understanding. [facility]ked [facility]. Sent provider message on this.

## 2022-05-19 NOTE — PROGRESS NOTES
Patient is already on the finasteride which was my thought to start.  I looked at afluZosin but still potentially same risk as the tamsulosin.  Discussed more in detail with wife about the medication timing, blood pressure, doses of the Parkinson's medications as well as how he is doing off the tamsulosin.  I put all this in the office note from last week so easy to find in 1 place.  Overnight he got up 5-6 times to urinate off the tamsulosin.  We decided to hold on new alpha-blocker and get him to cardiology.  I am also going to put in a call to Dr. Mcpherson his neurologist to see about options with his medication given his hypotension.

## 2022-05-19 NOTE — TELEPHONE ENCOUNTER
Please call wife. Looks like BP is not getting as low and also that the higher readings are getting higher.   I will check on cardiology appointment  I will send a new medication for his prostate that will not effect the blood pressure.

## 2022-05-19 NOTE — TELEPHONE ENCOUNTER
PATIENTS WIFE CALLED WITH HIS BLOOD PRESSURE FOR YESTERDAY 5/18  102/69 PULSE 72/AFTER BREAKFAST    99/63 PULSE 76 AFTER EXERCISE    85/56 PULSE 66/ AFTER DINNER    TODAY    170/107 PULSE 71 LAYING IN BED THIS MORNING  151/105 PULSE 102/ AFTER A MINUTE  127/87 PULSE 91 AFTER 3 MINUTES    96/72 PULSE 94 AFTER BREAKFAST    97-68 PULSE 76 AFTER LUNCH      HE IS NOT TAKING HIS TAMILOSIN AND HAS BEEN UP EVERY COUPLE HOURS IN THE NIGHT.

## 2022-05-23 NOTE — TELEPHONE ENCOUNTER
Caller: Shanique Silverman    Relationship to patient: Emergency Contact    Best call back number: [phone]    Patient is needing:WIFE STATES SHE AND PROVIDER HAVE GITA KEEPING CLOSE TABS ON PATIENTS BLOOD PRESSURE. WIFE WANTED DR NAVA TO KNOW THAT  PATIENT HAS APPOINTMENT WITH PREVIOUS CARDIOLOGIST June 2, 2022.

## 2022-05-26 ENCOUNTER — OFFICE VISIT (OUTPATIENT)
Dept: CARDIOLOGY | Facility: CLINIC | Age: 72
End: 2022-05-26

## 2022-05-26 VITALS
WEIGHT: 153 LBS | DIASTOLIC BLOOD PRESSURE: 63 MMHG | SYSTOLIC BLOOD PRESSURE: 94 MMHG | HEIGHT: 67 IN | BODY MASS INDEX: 24.01 KG/M2 | HEART RATE: 62 BPM

## 2022-05-26 DIAGNOSIS — R55 NEAR SYNCOPE: ICD-10-CM

## 2022-05-26 DIAGNOSIS — R42 DIZZINESS: Primary | ICD-10-CM

## 2022-05-26 DIAGNOSIS — R53.83 OTHER FATIGUE: ICD-10-CM

## 2022-05-26 DIAGNOSIS — I65.23 CAROTID ARTERY PLAQUE, BILATERAL: ICD-10-CM

## 2022-05-26 PROCEDURE — 93000 ELECTROCARDIOGRAM COMPLETE: CPT | Performed by: NURSE PRACTITIONER

## 2022-05-26 PROCEDURE — 99214 OFFICE O/P EST MOD 30 MIN: CPT | Performed by: NURSE PRACTITIONER

## 2022-05-26 RX ORDER — FLUDROCORTISONE ACETATE 0.1 MG/1
0.1 TABLET ORAL DAILY
Qty: 90 TABLET | Refills: 2 | Status: SHIPPED | OUTPATIENT
Start: 2022-05-26 | End: 2022-06-07 | Stop reason: SDUPTHER

## 2022-05-26 RX ORDER — FLUDROCORTISONE ACETATE 0.1 MG/1
0.1 TABLET ORAL DAILY
Status: CANCELLED | OUTPATIENT
Start: 2022-05-26

## 2022-05-26 RX ORDER — CHOLECALCIFEROL (VITAMIN D3) 125 MCG
5 CAPSULE ORAL
COMMUNITY
End: 2023-02-22

## 2022-05-26 NOTE — PROGRESS NOTES
Date of Office Visit: 22  Encounter Provider: RUSSELL Gabriel  Place of Service: [facility] CARDIOLOGY  Patient Name: Rei Silverman  :1950    Chief Complaint   Patient presents with   • Cryptogenic stroke    • Mixed hyperlipidemia   • Dizziness   • Loss of Consciousness   • Follow-up   :     HPI: Rei Silverman is a 71 y.o. male  with probable TIA, Parkinson's, colon polyps, and hyperlipidemia. He is followed by Dr. Claudio. I will be seeing him in follow up today.      He had issues with slurred and garbled speech in 2018 which lasted about 15-20 minutes and resolved.  This unfortunately recurred again and he was admitted and evaluated by the stroke team with a relatively negative workup. Echocardiogram was normal.  CT and MRI was negative. The neurologist in the hospital did not diagnose him with definitive TIA. He was continued on ASA at discharged and started on atorvastatin 80 mg. He then went to see his neurologist as outpatient who did suspected TIA.  He wore an extended monitor which ruled out atrial fibrillation.  He then had a Loop monitor inserted in 2018.  The site unfortunately was infected.  He completed antibiotic therapy but had that removed 2019. He was maintained on Plavix and had no recurrent neurologic events.  He had vascular screening completed in May 2019 which showed right carotid plaque without stenosis as well as left carotid plaque without stenosis.  The abdominal aorta was normal.  Peripheral arterial disease screening was normal.     He presents today accompanied by his wife.  He has been having dizzy spells and falling.  He had a syncopal episode/ and was sent to [facility] rehab.  He is now participating with occupational therapy and physical therapy.  He has not been doing his routine exercise which entails boxing walking.  He has developed dementia.  He was started on midodrine by his neurologist in January.   "No chest pain or edema.  No palpitations.            No Known Allergies        Family and social history reviewed.     ROS  All other systems were reviewed and are negative          Objective:     Vitals:    05/26/22 1023 05/26/22 1024   BP: 132/60 94/63   BP Location: Left arm Left arm   Patient Position: Sitting Sitting   Pulse: 62    Weight: 69.4 kg (153 lb)    Height: 168.9 cm (66.5\")    I performed orthostatic blood pressures.  Lying down he was 134/68 and then after sitting for 2 minutes his blood pressure was 82/54.  Body mass index is 24.32 kg/m².    PHYSICAL EXAM:  Cardiovascular:      Normal rate. Regular rhythm.           ECG 12 Lead    Date/Time: 5/26/2022 10:32 AM  Performed by: Alessia Levin APRN  Authorized by: Alessia Levin APRN   Comparison: compared with previous ECG   Similar to previous ECG  Rhythm: sinus rhythm  Comments: No ischemic changes               Current Outpatient Medications   Medication Sig Dispense Refill   • B Complex-C (SUPER B COMPLEX PO) Take 1 tablet by mouth Daily.     • carbidopa-levodopa-entacapone (STALEVO) 37.5-150-200 MG per tablet TAKE 1 TABLET BY MOUTH THREE TIMES A  tablet 3   • clopidogrel (PLAVIX) 75 MG tablet Take 1 tablet by mouth Daily. 90 tablet 3   • ELITE ZINC PO Take  by mouth.     • finasteride (PROSCAR) 5 MG tablet TAKE 1 TABLET BY MOUTH EVERY DAY 90 tablet 1   • Magnesium 500 MG tablet Take  by mouth Daily.     • Magnesium Oxide 400 (240 Mg) MG tablet 200 mg.     • melatonin 5 MG tablet tablet Take 5 mg by mouth.     • midodrine (PROAMATINE) 10 MG tablet Take 10 mg by mouth 3 (Three) Times a Day.     • Multiple Vitamins-Minerals (MULTIVITAMIN ADULT PO) Take 1 tablet by mouth Daily.     • Omega-3 Fatty Acids (OMEGA 3 500 PO) Take 500 mg by mouth Daily.     • rasagiline (AZILECT) 1 MG tablet Take 1 tablet by mouth Daily. 90 tablet 2   • rivastigmine (EXELON) 9.5 MG/24HR patch Place 1 patch on the skin as directed by provider Daily. 30 patch 5   • " vitamin E 1000 UNIT capsule Take 400 Units by mouth Daily.     • fludrocortisone 0.1 MG tablet Take 1 tablet by mouth Daily. 90 tablet 2     No current facility-administered medications for this visit.     Assessment:       Diagnosis Plan   1. Dizziness  ECG 12 Lead    Adult Transthoracic Echo Complete W/ Cont if Necessary Per Protocol    Duplex Carotid Ultrasound CAR   2. Other fatigue  Adult Transthoracic Echo Complete W/ Cont if Necessary Per Protocol    Duplex Carotid Ultrasound CAR   3. Near syncope  Adult Transthoracic Echo Complete W/ Cont if Necessary Per Protocol   4. Carotid artery plaque, bilateral  Duplex Carotid Ultrasound CAR        Orders Placed This Encounter   Procedures   • ECG 12 Lead     This order was created via procedure documentation     Order Specific Question:   Release to patient     Answer:   Immediate   • Adult Transthoracic Echo Complete W/ Cont if Necessary Per Protocol     Standing Status:   Future     Standing Expiration Date:   5/26/2023     Order Specific Question:   Reason for exam?     Answer:   Syncope         Plan:         1.  71 -year-old gentleman with neurologic symptoms possible cerebrovascular accident however not confirmed.  Status post implantable loop recorder but then infected had to be removed 01/2019.   Has had no further symptoms.  It is not clear he had a TIA or stroke.  Maintained on Plavix.  2.  History of Parkinson's following with neurology.  Dr. Mcpherson  3.  Hyperlipidemia now on target dose atorvastatin  4.  History of colon polyps.    5. Carotid artery plaque without stenosis bilateral on duplex May 2019 ordered repeat duplex  6.  Hypotension and orthostasis likely some component of autonomic dysfunction secondary to Parkinson's disease.  He is on midodrine 10 mg 3 times daily.  I will add fludrocortisone 0.1 mg daily.  I advised blood pressure monitoring 1 to 2 hours after dose of medication and he is to follow-up with me in 6 weeks.  He  7.  Syncope  likely related to orthostasis.  Will obtain echocardiogram as well as carotid duplex as listed above    8.  Dementia-following with neurology          It has been a pleasure to participate in this patient's care.      Thank you,  RUSSELL Gabriel      **I used Dragon to dictate this note:**

## 2022-05-27 NOTE — TELEPHONE ENCOUNTER
PATIENTS WIFE IS CALLING TO LET US KNOW THEY GOT IN TO SEE THE CARDIOLOGIST. THE CARDIOLOGIST SAID HE IS TO CONTINUE midodrine (PROAMATINE) 10 MG tablet AND ADDED fludrocortisone 0.1 MG tablet  TO CONTINUE THAT AND THEY WILL DO BLOOD PRESSURE READINGS 3 TIMES A DAY AND FOLLOW  UP WITH HER IN 6 WEEKS.     THEY ARE SCHEDULED FOR A CARDIOGRAM AND AN ECHOGRAM.     THEY SAID THE CARDIOLOGIST SAID TO DEFINITELY GO ON HIS VACATION.

## 2022-05-27 NOTE — TELEPHONE ENCOUNTER
I reviewed cardiology note from yesterday. Having echo and carotid duplex. I sent message through [employer].

## 2022-06-03 ENCOUNTER — TELEPHONE (OUTPATIENT)
Dept: CARDIOLOGY | Facility: CLINIC | Age: 72
End: 2022-06-03

## 2022-06-03 NOTE — TELEPHONE ENCOUNTER
Patient's wife called to report since staring Fludrocortisone patient's BP has been high ( 180-190 systolic).  Patient is hallucinating and seeing bugs that aren't there.  Very paranoid and anxious.  Please advise.      Patient's wife can be reached at [phone]. / CAREN

## 2022-06-03 NOTE — TELEPHONE ENCOUNTER
Triage please advise to Split to half tablet and follow for a week. . It was given to help his low BP values.

## 2022-06-03 NOTE — TELEPHONE ENCOUNTER
Spoke to patient's wife about recommendations and she verbalized understanding.    Mari Velazquez RN  Triage [facility]

## 2022-06-07 RX ORDER — FLUDROCORTISONE ACETATE 0.1 MG/1
0.1 TABLET ORAL 2 TIMES DAILY
Qty: 60 TABLET | Refills: 5 | Status: SHIPPED | OUTPATIENT
Start: 2022-06-07 | End: 2022-06-07 | Stop reason: SDUPTHER

## 2022-06-07 RX ORDER — FLUDROCORTISONE ACETATE 0.1 MG/1
0.05 TABLET ORAL 2 TIMES DAILY
Qty: 60 TABLET | Refills: 5
Start: 2022-06-07 | End: 2023-02-17

## 2022-06-07 NOTE — TELEPHONE ENCOUNTER
Patient's wife Called to report the Physical Therapist did orthostat on patient today and Lying BP was 145/77 and standing BP 82/53.  She asks if should be giving both the Fludrocorisone and Midodrine at the same time every morning or should these be split up.  Could the Carbidopa Levadopa be effecting these as patient is given this in the am as well.   Please advise when the best time for patient to take them.  / CAREN     Patient's wife can be reached at [phone]

## 2022-06-07 NOTE — TELEPHONE ENCOUNTER
Ok. Let's try half tablet in am and 1/2 tablet pm instead. They need to monitor for hallucinations to return but hopefully they do not

## 2022-06-07 NOTE — TELEPHONE ENCOUNTER
Take fludrocortisone 0.1mg mg in am and pm and follow up with me in 3 weeks. ( same day as his testing is ok with me)

## 2022-06-07 NOTE — TELEPHONE ENCOUNTER
Triage- please call to discuss that am aware he has orthostatic hypotension. I performed orthostatics during last office visit and these are similar. If he did not pass out then he remains stable. H needs to perform PT if he does not feel like he is going to pass out. Fludrocortisone should be given at same time as first dose midodrine. Yes carbidopa can lower BP but I think it should be continued at this time given his parkinson's. Please ensure he has follow up with me

## 2022-06-07 NOTE — TELEPHONE ENCOUNTER
Spoke with patient's wife she states the patient did get dizzy and foggy/confused when standing to do PT and they had to sit him down and he improved. Wife says that she has noticed similar events when she stands him up and walks with him.    Do you have any further recommendations?    Mari Velazquez RN  Triage [facility]

## 2022-06-07 NOTE — TELEPHONE ENCOUNTER
Spoke to patient's wife about new medication changes.  She verbalizes understanding.    Scheduling:  Alessia would like a follow up appointment on the same day as his echo.  Will you please get that scheduled. Thank you    Mari Velazquez RN  Triage [facility]

## 2022-06-07 NOTE — TELEPHONE ENCOUNTER
I called and clarified the what dose of fludrocortisone the patient is taking.  He is taking 1/2 of a 0.1 mg tablet in the AM only, looks this decreased 4 days ago when the wife called us saying his BP was elevated and he having increased hallucinations.  Wife states hallucinations have been better on the lower dose.  I just want to further clarify you still want to change the dose to 0.1 mg of fludrocortisone BID?    Mari Velazquez RN  Triage [facility] [place]

## 2022-06-08 DIAGNOSIS — N40.0 BENIGN NON-NODULAR PROSTATIC HYPERPLASIA WITHOUT LOWER URINARY TRACT SYMPTOMS: ICD-10-CM

## 2022-06-08 NOTE — TELEPHONE ENCOUNTER
Rx Refill Note  Requested Prescriptions     Pending Prescriptions Disp Refills   • finasteride (PROSCAR) 5 MG tablet [Pharmacy Med Name: FINASTERIDE 5 MG TABLET] 90 tablet 1     Sig: TAKE 1 TABLET BY MOUTH EVERY DAY      Last office visit with prescribing clinician: 5/13/2022      Next office visit with prescribing clinician: 8/1/2022       {TIP  Please add Last Relevant Lab Date if appropriate: 4/20/22    Alexia Dominguez MA  06/08/22, 15:00 EDT

## 2022-06-09 RX ORDER — PYRIDOSTIGMINE BROMIDE 60 MG/1
60 TABLET ORAL 2 TIMES DAILY
Qty: 60 TABLET | Refills: 3 | Status: SHIPPED | OUTPATIENT
Start: 2022-06-09 | End: 2022-06-14

## 2022-06-09 RX ORDER — FINASTERIDE 5 MG/1
TABLET, FILM COATED ORAL
Qty: 90 TABLET | Refills: 1 | Status: SHIPPED | OUTPATIENT
Start: 2022-06-09 | End: 2023-02-01

## 2022-06-09 NOTE — TELEPHONE ENCOUNTER
Patient's wife called to report patient has a lot of dizziness.  His blood pressure yesterday morning was 97/54 and evening 126/77.   This am 71/53.      She did orthostats on patient and lying it was 131/74  Standing 111/81  HR 91.  Patient usually takes Midodrine around 1:00 pm.     Please call her at [phone]

## 2022-06-09 NOTE — TELEPHONE ENCOUNTER
Triage- please get update. I want him to take midodrine 10 mg 3 times daily. We last discussed taking fludrocortisone 1/2 tablet twice daily. A full tablet at once previously caused hallucinations reportedly. I want to try Mestinon to help raise his BP twice daily in addition to current regimen. Have them call with update on Monday after checking BP both am and pm. Ensure he is staying hydrated. Encourage high protein diet and ok to have a little extra salt in diet. ALso if he is not wearing compression stockings then he needs to start

## 2022-06-09 NOTE — TELEPHONE ENCOUNTER
Notified patient's wife to  OTC imodium with instructions for patient to take once daily for 3 days, and then skip for 1-2 days to avoid constipation. She verbalized understanding.     Cecilia Ornelas RN  [facility] Triage  06/09/22  14:50 EDT

## 2022-06-09 NOTE — TELEPHONE ENCOUNTER
Agree- his loose stool is contributing to lower readings. Ok to try OTC imodium once a day for 3 days then would skip a day or two to avoid constipation from it

## 2022-06-09 NOTE — TELEPHONE ENCOUNTER
Reviewed recommendations with patient's wife, notified that prescription for mestonin was sent to his pharmacy. Patient's wife is going to give this to him at 0900 and 1700, which is the same time he takes fludrocortisone. Patient is taking midodrine 10 mg at 0900, 1300 and 1700.    She is going to check his blood pressure AM and PM and call on Monday with the results, I also encouraged high protein diet and to start wearing compression stockings during the day. She verbalized understanding.     Patient's wife reports patient has been having increased bowel movements, sometimes getting up 3 times a night with loose stool. She asked if his current regimen or mestonin could cause this. I explained that this is not a side effect of midodrine, fludrocortisone or mestonin. Patient hasn't had any changes to his diet and is no longer taking a stool softener. I encouraged her to follow up with Dr. Fernandez about this, but please let me know if you have any additional recommendations.     Thank you,  Cecilia Ornelas RN  [facility] Triage  06/09/22  14:20 EDT

## 2022-06-13 NOTE — TELEPHONE ENCOUNTER
Patient's wife (Rosa) called to report patient took his first dose of Midodrine last Thursday.  Patient Took bid dose on Friday.  She states patient could hardly walk by Friday evening. He held Midodrine along with Fludrocortizone on Saturday and Sunday.    She is currently having patient take  Fludrocortizone 0.5 mg every morning and Midrodrine tid.   She stated patient did well on Saturday and Sunday. Patient continued to have dizziness this morning / CAREN     Please advise.      Patient's wife (Rosa) can be reached at [phone]

## 2022-06-14 NOTE — TELEPHONE ENCOUNTER
Notified patient's wife, she states she will continue giving fludrocortisone to him in the AM. She asked if his SBP is below 50 if she should give him a salt tablet.     Cecilia Ornelas RN  [facility] Triage  06/14/22  09:04 EDT

## 2022-06-14 NOTE — TELEPHONE ENCOUNTER
Patient's wife was notified that she can give the patient a salt tablet if his SBP is below 50. She denies any additional questions at this time.

## 2022-06-14 NOTE — TELEPHONE ENCOUNTER
Triage- please call to discuss it is Ok to continue. I was under the impression midodrine was 10 mg three times daily. But it is Ok to take 1/2 tablet fludrocortisone once daily at night instead of twice daily

## 2022-06-14 NOTE — TELEPHONE ENCOUNTER
Alessia,     Patient's wife clarified that he started mestonin Thursday night, and took it BID on Friday. Patient slept all day Friday and had difficulty ambulating by Friday night. His wife held mestonin Saturday and reports the patient was able to go for a 30 minute walk and was back to his normal self. Sunday patient was fatigued but no difficulty ambulating. Yesterday he was able to do 10 minutes of indoor cycling.     Patient has been taking his midodrine 10 mg TID and fludrocortisone half tablet each morning, but will start giving this to him nightly.     She provided the following BP readings taken 2 hours after patient takes medications:    Date: AM PM   6/10/2022 90/51  HR 62 110/66 HR 57   6/11/2022 101/61 HR 54 150/84 HR 62   6/12/2022 110/63 HR 58 113/68 HR 86   6/13/2022 98/55 HR 63 148/83 HR 58   6/14/2022 155/80      Let me know if you have any additional recommendations, otherwise patient will continue to hold mestonin and will take midodrine TID and fludrocortisone nightly.     Cecilia Ornelas RN  [facility] Triage  06/14/22  08:41 EDT

## 2022-06-29 ENCOUNTER — TELEPHONE (OUTPATIENT)
Dept: CARDIOLOGY | Facility: CLINIC | Age: 72
End: 2022-06-29

## 2022-06-29 ENCOUNTER — HOSPITAL ENCOUNTER (OUTPATIENT)
Dept: CARDIOLOGY | Facility: HOSPITAL | Age: 72
Discharge: HOME | End: 2022-06-29

## 2022-06-29 VITALS
SYSTOLIC BLOOD PRESSURE: 142 MMHG | WEIGHT: 153 LBS | BODY MASS INDEX: 24.59 KG/M2 | HEIGHT: 66 IN | DIASTOLIC BLOOD PRESSURE: 90 MMHG | HEART RATE: 70 BPM

## 2022-06-29 DIAGNOSIS — R53.83 OTHER FATIGUE: ICD-10-CM

## 2022-06-29 DIAGNOSIS — R42 DIZZINESS: ICD-10-CM

## 2022-06-29 DIAGNOSIS — I65.23 CAROTID ARTERY PLAQUE, BILATERAL: ICD-10-CM

## 2022-06-29 DIAGNOSIS — R55 NEAR SYNCOPE: ICD-10-CM

## 2022-06-29 LAB
BH CV ECHO MEAS - ACS: 1.93 CM
BH CV ECHO MEAS - AI P1/2T: 1109 MSEC
BH CV ECHO MEAS - AO MAX PG: 10.7 MMHG
BH CV ECHO MEAS - AO MEAN PG: 5.8 MMHG
BH CV ECHO MEAS - AO ROOT DIAM: 3.6 CM
BH CV ECHO MEAS - AO V2 MAX: 163.7 CM/SEC
BH CV ECHO MEAS - AO V2 VTI: 36.6 CM
BH CV ECHO MEAS - AVA(I,D): 1.57 CM2
BH CV ECHO MEAS - EDV(CUBED): 98.8 ML
BH CV ECHO MEAS - EDV(MOD-SP2): 94 ML
BH CV ECHO MEAS - EDV(MOD-SP4): 96 ML
BH CV ECHO MEAS - EF(MOD-BP): 51.4 %
BH CV ECHO MEAS - EF(MOD-SP2): 54.3 %
BH CV ECHO MEAS - EF(MOD-SP4): 53.1 %
BH CV ECHO MEAS - EF_3D-VOL: 56 %
BH CV ECHO MEAS - ESV(MOD-SP2): 43 ML
BH CV ECHO MEAS - ESV(MOD-SP4): 45 ML
BH CV ECHO MEAS - FS: 25.2 %
BH CV ECHO MEAS - IVS/LVPW: 0.89 CM
BH CV ECHO MEAS - IVSD: 0.79 CM
BH CV ECHO MEAS - LA 3D VOL INDEX: 46
BH CV ECHO MEAS - LAT PEAK E' VEL: 8.5 CM/SEC
BH CV ECHO MEAS - LV DIASTOLIC VOL/BSA (35-75): 53.8 CM2
BH CV ECHO MEAS - LV MASS(C)D: 126.7 GRAMS
BH CV ECHO MEAS - LV MAX PG: 2.35 MMHG
BH CV ECHO MEAS - LV MEAN PG: 1.42 MMHG
BH CV ECHO MEAS - LV SYSTOLIC VOL/BSA (12-30): 25.2 CM2
BH CV ECHO MEAS - LV V1 MAX: 76.6 CM/SEC
BH CV ECHO MEAS - LV V1 VTI: 18.1 CM
BH CV ECHO MEAS - LVIDD: 4.6 CM
BH CV ECHO MEAS - LVIDS: 3.5 CM
BH CV ECHO MEAS - LVOT AREA: 3.2 CM2
BH CV ECHO MEAS - LVOT DIAM: 2.01 CM
BH CV ECHO MEAS - LVPWD: 0.89 CM
BH CV ECHO MEAS - MED PEAK E' VEL: 5.4 CM/SEC
BH CV ECHO MEAS - MR MAX PG: 113.2 MMHG
BH CV ECHO MEAS - MR MAX VEL: 532.1 CM/SEC
BH CV ECHO MEAS - MV A DUR: 0.14 SEC
BH CV ECHO MEAS - MV A MAX VEL: 84.8 CM/SEC
BH CV ECHO MEAS - MV DEC SLOPE: 283.7 CM/SEC2
BH CV ECHO MEAS - MV DEC TIME: 0.18 MSEC
BH CV ECHO MEAS - MV E MAX VEL: 61.8 CM/SEC
BH CV ECHO MEAS - MV E/A: 0.73
BH CV ECHO MEAS - MV MAX PG: 4.2 MMHG
BH CV ECHO MEAS - MV MEAN PG: 1.26 MMHG
BH CV ECHO MEAS - MV P1/2T: 68.9 MSEC
BH CV ECHO MEAS - MV V2 VTI: 28.2 CM
BH CV ECHO MEAS - MVA(P1/2T): 3.2 CM2
BH CV ECHO MEAS - MVA(VTI): 2.04 CM2
BH CV ECHO MEAS - PA ACC TIME: 0.14 SEC
BH CV ECHO MEAS - PA PR(ACCEL): 15.6 MMHG
BH CV ECHO MEAS - PA V2 MAX: 138.8 CM/SEC
BH CV ECHO MEAS - PULM A REVS DUR: 0.14 SEC
BH CV ECHO MEAS - PULM A REVS VEL: 40 CM/SEC
BH CV ECHO MEAS - PULM DIAS VEL: 49.5 CM/SEC
BH CV ECHO MEAS - PULM S/D: 1.37
BH CV ECHO MEAS - PULM SYS VEL: 67.6 CM/SEC
BH CV ECHO MEAS - QP/QS: 0.68
BH CV ECHO MEAS - RAP SYSTOLE: 3 MMHG
BH CV ECHO MEAS - RV MAX PG: 2.7 MMHG
BH CV ECHO MEAS - RV V1 MAX: 82 CM/SEC
BH CV ECHO MEAS - RV V1 VTI: 18.5 CM
BH CV ECHO MEAS - RVOT DIAM: 1.64 CM
BH CV ECHO MEAS - RVSP: 30.2 MMHG
BH CV ECHO MEAS - SI(MOD-SP2): 28.6 ML/M2
BH CV ECHO MEAS - SI(MOD-SP4): 28.6 ML/M2
BH CV ECHO MEAS - SV(LVOT): 57.6 ML
BH CV ECHO MEAS - SV(MOD-SP2): 51 ML
BH CV ECHO MEAS - SV(MOD-SP4): 51 ML
BH CV ECHO MEAS - SV(RVOT): 38.9 ML
BH CV ECHO MEAS - TAPSE (>1.6): 2.9 CM
BH CV ECHO MEAS - TR MAX PG: 27.2 MMHG
BH CV ECHO MEAS - TR MAX VEL: 260.6 CM/SEC
BH CV ECHO MEASUREMENTS AVERAGE E/E' RATIO: 8.89
BH CV XLRA - TDI S': 13.6 CM/SEC
BH CV XLRA MEAS LEFT DIST CCA EDV: 21.4 CM/SEC
BH CV XLRA MEAS LEFT DIST CCA PSV: 72.2 CM/SEC
BH CV XLRA MEAS LEFT DIST ICA EDV: 14.7 CM/SEC
BH CV XLRA MEAS LEFT DIST ICA PSV: 55.3 CM/SEC
BH CV XLRA MEAS LEFT ICA/CCA RATIO: 1.19
BH CV XLRA MEAS LEFT MID CCA EDV: 18.4 CM/SEC
BH CV XLRA MEAS LEFT MID CCA PSV: 64.1 CM/SEC
BH CV XLRA MEAS LEFT MID ICA EDV: -13.3 CM/SEC
BH CV XLRA MEAS LEFT MID ICA PSV: -77.4 CM/SEC
BH CV XLRA MEAS LEFT PROX CCA EDV: 18.4 CM/SEC
BH CV XLRA MEAS LEFT PROX CCA PSV: 61.9 CM/SEC
BH CV XLRA MEAS LEFT PROX ECA PSV: -53.1 CM/SEC
BH CV XLRA MEAS LEFT PROX ICA EDV: -15.5 CM/SEC
BH CV XLRA MEAS LEFT PROX ICA PSV: -86.2 CM/SEC
BH CV XLRA MEAS LEFT VERTEBRAL A EDV: 10.3 CM/SEC
BH CV XLRA MEAS LEFT VERTEBRAL A PSV: 25.8 CM/SEC
BH CV XLRA MEAS RIGHT DIST CCA EDV: 21.2 CM/SEC
BH CV XLRA MEAS RIGHT DIST CCA PSV: 87 CM/SEC
BH CV XLRA MEAS RIGHT DIST ICA EDV: -18 CM/SEC
BH CV XLRA MEAS RIGHT DIST ICA PSV: -61.4 CM/SEC
BH CV XLRA MEAS RIGHT ICA/CCA RATIO: 0.82
BH CV XLRA MEAS RIGHT MID CCA EDV: 25.9 CM/SEC
BH CV XLRA MEAS RIGHT MID CCA PSV: 104.3 CM/SEC
BH CV XLRA MEAS RIGHT MID ICA EDV: -24.7 CM/SEC
BH CV XLRA MEAS RIGHT MID ICA PSV: -72.7 CM/SEC
BH CV XLRA MEAS RIGHT PROX CCA EDV: 25.9 CM/SEC
BH CV XLRA MEAS RIGHT PROX CCA PSV: 109.3 CM/SEC
BH CV XLRA MEAS RIGHT PROX ECA PSV: -72.7 CM/SEC
BH CV XLRA MEAS RIGHT PROX ICA EDV: -18 CM/SEC
BH CV XLRA MEAS RIGHT PROX ICA PSV: -47.3 CM/SEC
BH CV XLRA MEAS RIGHT VERTEBRAL A EDV: -11.8 CM/SEC
BH CV XLRA MEAS RIGHT VERTEBRAL A PSV: -42.8 CM/SEC
LEFT ATRIUM VOLUME INDEX: 25.5 ML/M2
LV EF 2D ECHO EST: 51 %
MAXIMAL PREDICTED HEART RATE: 149 BPM
MAXIMAL PREDICTED HEART RATE: 149 BPM
SINUS: 3 CM
STJ: 3.2 CM
STRESS TARGET HR: 127 BPM
STRESS TARGET HR: 127 BPM

## 2022-06-29 PROCEDURE — 93880 EXTRACRANIAL BILAT STUDY: CPT

## 2022-06-29 PROCEDURE — 93880 EXTRACRANIAL BILAT STUDY: CPT | Performed by: INTERNAL MEDICINE

## 2022-06-29 PROCEDURE — 93306 TTE W/DOPPLER COMPLETE: CPT

## 2022-06-29 PROCEDURE — 93306 TTE W/DOPPLER COMPLETE: CPT | Performed by: INTERNAL MEDICINE

## 2022-06-29 NOTE — TELEPHONE ENCOUNTER
Called but no answer so I  left message no significant change on carotid or echo.   Most readings are between 100-150/60-80 however some systolic values are as low as 71, 81, 92.  His pulse is mainly 50-80s. Would continue fludrocortisone dosing with midodrine and have him keep upcoming appt as scheduled.     If he calls back, ok to talk with triage

## 2022-07-18 ENCOUNTER — OFFICE VISIT (OUTPATIENT)
Dept: CARDIOLOGY | Facility: CLINIC | Age: 72
End: 2022-07-18

## 2022-07-18 VITALS
DIASTOLIC BLOOD PRESSURE: 70 MMHG | SYSTOLIC BLOOD PRESSURE: 90 MMHG | WEIGHT: 152 LBS | HEIGHT: 66 IN | HEART RATE: 69 BPM | OXYGEN SATURATION: 100 % | BODY MASS INDEX: 24.43 KG/M2

## 2022-07-18 DIAGNOSIS — I65.23 CAROTID ARTERY PLAQUE, BILATERAL: Primary | ICD-10-CM

## 2022-07-18 DIAGNOSIS — I95.1 AUTONOMIC ORTHOSTATIC HYPOTENSION: ICD-10-CM

## 2022-07-18 PROCEDURE — 99214 OFFICE O/P EST MOD 30 MIN: CPT | Performed by: NURSE PRACTITIONER

## 2022-07-18 NOTE — PROGRESS NOTES
Date of Office Visit: 22  Encounter Provider: RUSSELL Gabriel  Place of Service: [facility] CARDIOLOGY  Patient Name: Rei Silverman  :1950    Chief Complaint   Patient presents with   • Transient Ischemic Attack   • Mixed hyperlipidemia   • Hyperlipidemia   • Follow-up   :     HPI: Rei Silverman is a 71 y.o. male  with probable TIA, Parkinson's, colon polyps, and hyperlipidemia. He is followed by Dr. Claudio. I will be seeing him in follow up today.      He had issues with slurred and garbled speech in 2018 which lasted about 15-20 minutes and resolved.  This unfortunately recurred again and he was admitted and evaluated by the stroke team with a relatively negative workup. Echocardiogram was normal.  CT and MRI was negative. The neurologist in the hospital did not diagnose him with definitive TIA. He was continued on ASA at discharged and started on atorvastatin 80 mg. He then went to see his neurologist as outpatient who did suspected TIA.  He wore an extended monitor which ruled out atrial fibrillation.  He then had a Loop monitor inserted in 2018.  The site unfortunately was infected.  He completed antibiotic therapy but had that removed 2019. He was maintained on Plavix and had no recurrent neurologic events.  He had vascular screening completed in May 2019 which showed right carotid plaque without stenosis as well as left carotid plaque without stenosis.  The abdominal aorta was normal.  Peripheral arterial disease screening was normal.  He had a syncopal spell 2022 and sent to [facility] rehab.  He was started on midodrine by his neurologist around that time.  In May 2022 he continued to have dizzy spells.  I started him on fludrocortisone in addition to midodrine.  Carotid duplex showed no change and echocardiogram showed normal left ventricular systolic function, trace mitral valve regurgitation and normal RVSP.  He presents today  "for 6-week follow-up.  He has been feeling better on fludrocortisone half tablet twice daily.  He follows up with his neurologist next 2 weeks.  His Botox injections are more frequent but that seems to be helping his Parkinson syndrome.  His memory continues to be an issue.  Patient is walking a mile and 1/2-hour with his wife when the weather permits.  He is also able to do stationary bike for 10 to 15 minutes.  He has no near-syncope or syncope or falls since he last saw me.  His wife is going to have some help at the house starting soon.  No chest pain tightness pressure or edema.            No Known Allergies        Family and social history reviewed.     ROS  All other systems were reviewed and are negative          Objective:     Vitals:    07/18/22 1049   BP: 90/70   BP Location: Left arm   Patient Position: Sitting   Pulse: 69   SpO2: 100%   Weight: 68.9 kg (152 lb)   Height: 167.6 cm (66\")     Body mass index is 24.53 kg/m².    PHYSICAL EXAM:  Cardiovascular:      Normal rate. Regular rhythm.         Procedures      Current Outpatient Medications   Medication Sig Dispense Refill   • B Complex-C (SUPER B COMPLEX PO) Take 1 tablet by mouth Daily.     • carbidopa-levodopa-entacapone (STALEVO) 37.5-150-200 MG per tablet TAKE 1 TABLET BY MOUTH THREE TIMES A  tablet 3   • clopidogrel (PLAVIX) 75 MG tablet Take 1 tablet by mouth Daily. 90 tablet 3   • ELITE ZINC PO Take  by mouth.     • finasteride (PROSCAR) 5 MG tablet TAKE 1 TABLET BY MOUTH EVERY DAY 90 tablet 1   • fludrocortisone 0.1 MG tablet Take 0.5 tablets by mouth 2 (Two) Times a Day. 60 tablet 5   • Magnesium 500 MG tablet Take  by mouth Daily.     • melatonin 5 MG tablet tablet Take 5 mg by mouth.     • midodrine (PROAMATINE) 10 MG tablet Take 10 mg by mouth 3 (Three) Times a Day.     • Multiple Vitamins-Minerals (MULTIVITAMIN ADULT PO) Take 1 tablet by mouth Daily.     • Omega-3 Fatty Acids (OMEGA 3 500 PO) Take 500 mg by mouth Daily.     • " rasagiline (AZILECT) 1 MG tablet Take 1 tablet by mouth Daily. 90 tablet 2   • rivastigmine (EXELON) 9.5 MG/24HR patch Place 1 patch on the skin as directed by provider Daily. 30 patch 5   • vitamin E 1000 UNIT capsule Take 400 Units by mouth Daily.       No current facility-administered medications for this visit.     Assessment:       Diagnosis Plan   1. Carotid artery plaque, bilateral     2. Autonomic orthostatic hypotension          No orders of the defined types were placed in this encounter.        Plan:        1.  71 -year-old gentleman with neurologic symptoms possible cerebrovascular accident however not confirmed.  Status post implantable loop recorder but then infected had to be removed 01/2019.   Has had no further symptoms.  It is not clear he had a TIA or stroke.  Maintained on Plavix.  2.  History of Parkinson's following with neurology with Dr. Mcpherson.  Wife reports improvement since he has been on Botox  3.  Hyperlipidemia now on target dose atorvastatin  4.  History of colon polyps.    5. Carotid artery plaque without stenosis bilateral on duplex May 2019 ordered repeat duplex  6.  Orthostatic hypotension felt to be likely some component of autonomic dysfunction secondary to Parkinson's disease.  He is on midodrine 10 mg 3 times daily and fludrocortisone 0.1 mg half tablet twice daily.  Blood pressure stable.  He has a few values 90 systolic but no near-syncope or syncope.  We will continue current regimen at this time  7.  Syncope likely related to orthostasis.    Unremarkable echo and carotid duplex recently showed no significant change   8.  Dementia-following with neurology      Follow-up in 3 months.  I will have him meet Dr. Melvin at that time.  Call me with questions or concerns            It has been a pleasure to participate in this patient's care.      Thank you,  RUSSELL Gabriel      **I used Dragon to dictate this note:**

## 2022-08-01 ENCOUNTER — OFFICE VISIT (OUTPATIENT)
Dept: FAMILY MEDICINE CLINIC | Facility: CLINIC | Age: 72
End: 2022-08-01

## 2022-08-01 VITALS
RESPIRATION RATE: 17 BRPM | HEIGHT: 66 IN | OXYGEN SATURATION: 98 % | BODY MASS INDEX: 24.53 KG/M2 | TEMPERATURE: 98 F | HEART RATE: 70 BPM | DIASTOLIC BLOOD PRESSURE: 72 MMHG | SYSTOLIC BLOOD PRESSURE: 110 MMHG

## 2022-08-01 DIAGNOSIS — Z00.00 MEDICARE ANNUAL WELLNESS VISIT, SUBSEQUENT: Primary | ICD-10-CM

## 2022-08-01 DIAGNOSIS — E78.2 MIXED HYPERLIPIDEMIA: ICD-10-CM

## 2022-08-01 PROCEDURE — 1170F FXNL STATUS ASSESSED: CPT | Performed by: FAMILY MEDICINE

## 2022-08-01 PROCEDURE — 1160F RVW MEDS BY RX/DR IN RCRD: CPT | Performed by: FAMILY MEDICINE

## 2022-08-01 PROCEDURE — G0439 PPPS, SUBSEQ VISIT: HCPCS | Performed by: FAMILY MEDICINE

## 2022-08-01 RX ORDER — KRILL/OM-3/DHA/EPA/PHOSPHO/AST 500-110 MG
1 CAPSULE ORAL DAILY
COMMUNITY
End: 2023-02-22

## 2022-08-01 NOTE — PROGRESS NOTES
The ABCs of the Annual Wellness Visit  Subsequent Medicare Wellness Visit    Chief Complaint   Patient presents with   • Medicare Wellness-subsequent      Subjective    History of Present Illness:  Rei Silverman is a 71 y.o. male who presents for a Subsequent Medicare Wellness Visit.    The following portions of the patient's history were reviewed and   updated as appropriate: allergies, current medications, past family history, past medical history, past social history, past surgical history and problem list.    Compared to one year ago, the patient feels his physical   health is worse.    Compared to one year ago, the patient feels his mental   health is the same.    Recent Hospitalizations:  He was admitted to the hospital during the last year.       Current Medical Providers:  Patient Care Team:  Mary Fernandez MD as PCP - General (Family Medicine)    Outpatient Medications Prior to Visit   Medication Sig Dispense Refill   • B Complex-C (SUPER B COMPLEX PO) Take 1 tablet by mouth Daily.     • carbidopa-levodopa-entacapone (STALEVO) 37.5-150-200 MG per tablet TAKE 1 TABLET BY MOUTH THREE TIMES A  tablet 3   • clopidogrel (PLAVIX) 75 MG tablet Take 1 tablet by mouth Daily. 90 tablet 3   • finasteride (PROSCAR) 5 MG tablet TAKE 1 TABLET BY MOUTH EVERY DAY 90 tablet 1   • Krill Oil (Omega-3) 500 MG capsule Take 1 capsule by mouth Daily.     • Magnesium 500 MG tablet Take 400 mg by mouth Daily.     • melatonin 5 MG tablet tablet Take 5 mg by mouth.     • midodrine (PROAMATINE) 10 MG tablet Take 10 mg by mouth 3 (Three) Times a Day.     • Multiple Vitamins-Minerals (MULTIVITAMIN ADULT PO) Take 1 tablet by mouth Daily.     • Omega-3 Fatty Acids (OMEGA 3 500 PO) Take 500 mg by mouth Daily.     • rasagiline (AZILECT) 1 MG tablet Take 1 tablet by mouth Daily. 90 tablet 2   • rivastigmine (EXELON) 9.5 MG/24HR patch Place 1 patch on the skin as directed by provider Daily. 30 patch 5   • vitamin E 1000 UNIT capsule  "Take 400 Units by mouth Daily.     • Zinc 50 MG capsule Take 1 tablet by mouth Daily.     • fludrocortisone 0.1 MG tablet Take 0.5 tablets by mouth 2 (Two) Times a Day. 60 tablet 5   • ELITE ZINC PO Take  by mouth.       No facility-administered medications prior to visit.       No opioid medication identified on active medication list. I have reviewed chart for other potential  high risk medication/s and harmful drug interactions in the elderly.          Aspirin is not on active medication list.  Aspirin use is not indicated based on review of current medical condition/s. Risk of harm outweighs potential benefits.  .    Patient Active Problem List   Diagnosis   • Benign prostatic hyperplasia   • Cortical dysplasia (HCC)   • Hyperlipidemia   • Impaired fasting glucose   • Parkinson's disease (HCC)   • Restless legs syndrome   • History of colon polyps   • TIA (transient ischemic attack)   • Cerebrovascular accident (CVA) due to embolism of precerebral artery (HCC)   • Abnormal central nervous system diagnostic imaging   • Cognitive changes   • Dementia due to Parkinson's disease without behavioral disturbance (HCC)   • Diplopia   • RBD (REM behavioral disorder)   • Sialorrhea   • Frequent falls     Advance Care Planning  Advance Directive is on file.  ACP discussion was held with the patient during this visit. Patient has an advance directive in EMR which is still valid.           Objective    Vitals:    08/01/22 1123   BP: 110/72   BP Location: Right arm   Patient Position: Sitting   Cuff Size: Adult   Pulse: 70   Resp: 17   Temp: 98 °F (36.7 °C)   TempSrc: Infrared   SpO2: 98%   Height: 167.6 cm (66\")   PainSc: 0-No pain     Estimated body mass index is 24.53 kg/m² as calculated from the following:    Height as of this encounter: 167.6 cm (66\").    Weight as of 7/18/22: 68.9 kg (152 lb).    BMI is within normal parameters. No other follow-up for BMI required.      Does the patient have evidence of cognitive " impairment? No    Physical Exam  Vitals reviewed.   Constitutional:       General: He is not in acute distress.     Appearance: Normal appearance. He is normal weight. He is not ill-appearing or toxic-appearing.   HENT:      Head: Normocephalic and atraumatic.      Right Ear: Tympanic membrane, ear canal and external ear normal. There is no impacted cerumen.      Left Ear: Tympanic membrane, ear canal and external ear normal. There is no impacted cerumen.      Nose: Nose normal.      Mouth/Throat:      Mouth: Mucous membranes are moist.      Pharynx: Oropharynx is clear. No oropharyngeal exudate.   Eyes:      General: No scleral icterus.        Right eye: No discharge.         Left eye: No discharge.      Conjunctiva/sclera: Conjunctivae normal.   Neck:      Thyroid: No thyromegaly.      Vascular: No carotid bruit.   Cardiovascular:      Rate and Rhythm: Normal rate and regular rhythm.      Heart sounds: Normal heart sounds. No murmur heard.    No friction rub. No gallop.   Pulmonary:      Effort: Pulmonary effort is normal. No respiratory distress.      Breath sounds: Normal breath sounds. No wheezing or rales.   Chest:      Chest wall: No tenderness.   Abdominal:      General: Bowel sounds are normal. There is no distension.      Palpations: Abdomen is soft. There is no mass.      Tenderness: There is no abdominal tenderness. There is no guarding.   Musculoskeletal:         General: No swelling or deformity.      Cervical back: Neck supple.      Right lower leg: No edema.      Left lower leg: No edema.   Lymphadenopathy:      Cervical: No cervical adenopathy.   Skin:     Coloration: Skin is not jaundiced or pale.   Neurological:      Mental Status: He is alert and oriented to person, place, and time.      Motor: No abnormal muscle tone.      Comments: Sleepy today. Falling asleep when laid back for abdominal exam but easy to wake. Masked facies.    Psychiatric:         Mood and Affect: Mood normal.          Behavior: Behavior normal.       Lab Results   Component Value Date    CHLPL 263 (H) 08/01/2022    TRIG 181 (H) 08/01/2022    HDL 61 08/01/2022     (H) 08/01/2022    VLDL 33 08/01/2022            HEALTH RISK ASSESSMENT    Smoking Status:  Social History     Tobacco Use   Smoking Status Never Smoker   Smokeless Tobacco Never Used   Tobacco Comment    CAFFEINE USE - coffee 1.5 cups daily     Alcohol Consumption:  Social History     Substance and Sexual Activity   Alcohol Use Not Currently     Fall Risk Screen:    KEILAADI Fall Risk Assessment was completed, and patient is at HIGH risk for falls. Assessment completed on:8/1/2022    Depression Screening:  PHQ-2/PHQ-9 Depression Screening 8/1/2022   Retired PHQ-9 Total Score -   Retired Total Score -   Little Interest or Pleasure in Doing Things 0-->not at all   Feeling Down, Depressed or Hopeless 0-->not at all   PHQ-9: Brief Depression Severity Measure Score 0       Health Habits and Functional and Cognitive Screening:  Functional & Cognitive Status 8/1/2022   Do you have difficulty preparing food and eating? Yes   Do you have difficulty bathing yourself, getting dressed or grooming yourself? No   Do you have difficulty using the toilet? No   Do you have difficulty moving around from place to place? Yes   Do you have trouble with steps or getting out of a bed or a chair? Yes   Current Diet Well Balanced Diet   Dental Exam Up to date   Eye Exam Up to date   Exercise (times per week) 3 times per week   Current Exercises Include Walking;Stationary Bicycling/Spin Class   Current Exercise Activities Include -   Do you need help using the phone?  Yes   Are you deaf or do you have serious difficulty hearing?  No   Do you need help with transportation? Yes   Do you need help shopping? Yes   Do you need help preparing meals?  Yes   Do you need help with housework?  Yes   Do you need help with laundry? Yes   Do you need help taking your medications? Yes   Do you need help  managing money? Yes   Do you ever drive or ride in a car without wearing a seat belt? No   Have you felt unusual stress, anger or loneliness in the last month? No   Who do you live with? Spouse   If you need help, do you have trouble finding someone available to you? No   Have you been bothered in the last four weeks by sexual problems? No   Do you have difficulty concentrating, remembering or making decisions? Yes       Age-appropriate Screening Schedule:  Refer to the list below for future screening recommendations based on patient's age, sex and/or medical conditions. Orders for these recommended tests are listed in the plan section. The patient has been provided with a written plan.    Health Maintenance   Topic Date Due   • INFLUENZA VACCINE  10/01/2022   • LIPID PANEL  08/01/2023   • TDAP/TD VACCINES (4 - Td or Tdap) 05/17/2027   • ZOSTER VACCINE  Completed              Assessment & Plan   CMS Preventative Services Quick Reference  Risk Factors Identified During Encounter  Dementia/Memory   Depression/Dysphoria  Fall Risk-High or Moderate  The above risks/problems have been discussed with the patient.  Follow up actions/plans if indicated are seen below in the Assessment/Plan Section.  Pertinent information has been shared with the patient in the After Visit Summary.    Diagnoses and all orders for this visit:    1. Medicare annual wellness visit, subsequent (Primary)    2. Mixed hyperlipidemia  -     Comprehensive Metabolic Panel  -     Lipid Panel  -     CBC & Differential        Follow Up:   No follow-ups on file.     An After Visit Summary and PPPS were made available to the patient.             Pt was more sleepy today but wife explained this is the time of the day where he has his medication and usually takes a nap. Used to resting now.     He has had a decline in his mobility since his fall and hospitalization. He had head trauma with the fall. He now can't participate in his boxing classes for [employer]. He  is riding bike at home and looking to get into water aerobics for the winter.     He was confused at the start of the exam.   Didn't answer as many of his questions today but he did converse briefly multiple times.

## 2022-08-02 LAB
ALBUMIN SERPL-MCNC: 4.6 G/DL (ref 3.7–4.7)
ALBUMIN/GLOB SERPL: 1.6 {RATIO} (ref 1.2–2.2)
ALP SERPL-CCNC: 84 IU/L (ref 44–121)
ALT SERPL-CCNC: 8 IU/L (ref 0–44)
AST SERPL-CCNC: 21 IU/L (ref 0–40)
BASOPHILS # BLD AUTO: 0.1 X10E3/UL (ref 0–0.2)
BASOPHILS NFR BLD AUTO: 1 %
BILIRUB SERPL-MCNC: 0.5 MG/DL (ref 0–1.2)
BUN SERPL-MCNC: 20 MG/DL (ref 8–27)
BUN/CREAT SERPL: 18 (ref 10–24)
CALCIUM SERPL-MCNC: 9.5 MG/DL (ref 8.6–10.2)
CHLORIDE SERPL-SCNC: 99 MMOL/L (ref 96–106)
CHOLEST SERPL-MCNC: 263 MG/DL (ref 100–199)
CO2 SERPL-SCNC: 26 MMOL/L (ref 20–29)
CREAT SERPL-MCNC: 1.13 MG/DL (ref 0.76–1.27)
EGFRCR SERPLBLD CKD-EPI 2021: 69 ML/MIN/1.73
EOSINOPHIL # BLD AUTO: 0 X10E3/UL (ref 0–0.4)
EOSINOPHIL NFR BLD AUTO: 1 %
ERYTHROCYTE [DISTWIDTH] IN BLOOD BY AUTOMATED COUNT: 12.5 % (ref 11.6–15.4)
GLOBULIN SER CALC-MCNC: 2.8 G/DL (ref 1.5–4.5)
GLUCOSE SERPL-MCNC: 83 MG/DL (ref 65–99)
HCT VFR BLD AUTO: 40.9 % (ref 37.5–51)
HDLC SERPL-MCNC: 61 MG/DL
HGB BLD-MCNC: 14.3 G/DL (ref 13–17.7)
IMM GRANULOCYTES # BLD AUTO: 0 X10E3/UL (ref 0–0.1)
IMM GRANULOCYTES NFR BLD AUTO: 0 %
LDLC SERPL CALC-MCNC: 169 MG/DL (ref 0–99)
LYMPHOCYTES # BLD AUTO: 1.2 X10E3/UL (ref 0.7–3.1)
LYMPHOCYTES NFR BLD AUTO: 18 %
MCH RBC QN AUTO: 34.2 PG (ref 26.6–33)
MCHC RBC AUTO-ENTMCNC: 35 G/DL (ref 31.5–35.7)
MCV RBC AUTO: 98 FL (ref 79–97)
MONOCYTES # BLD AUTO: 0.6 X10E3/UL (ref 0.1–0.9)
MONOCYTES NFR BLD AUTO: 10 %
NEUTROPHILS # BLD AUTO: 4.7 X10E3/UL (ref 1.4–7)
NEUTROPHILS NFR BLD AUTO: 70 %
PLATELET # BLD AUTO: 330 X10E3/UL (ref 150–450)
POTASSIUM SERPL-SCNC: 4.8 MMOL/L (ref 3.5–5.2)
PROT SERPL-MCNC: 7.4 G/DL (ref 6–8.5)
RBC # BLD AUTO: 4.18 X10E6/UL (ref 4.14–5.8)
SODIUM SERPL-SCNC: 137 MMOL/L (ref 134–144)
TRIGL SERPL-MCNC: 181 MG/DL (ref 0–149)
VLDLC SERPL CALC-MCNC: 33 MG/DL (ref 5–40)
WBC # BLD AUTO: 6.6 X10E3/UL (ref 3.4–10.8)

## 2022-08-25 RX ORDER — CLOPIDOGREL BISULFATE 75 MG/1
TABLET ORAL
Qty: 90 TABLET | Refills: 0 | Status: SHIPPED | OUTPATIENT
Start: 2022-08-25 | End: 2022-12-30 | Stop reason: SDUPTHER

## 2022-09-08 RX ORDER — PYRIDOSTIGMINE BROMIDE 60 MG/1
TABLET ORAL
Qty: 180 TABLET | Refills: 1 | Status: SHIPPED | OUTPATIENT
Start: 2022-09-08

## 2022-09-09 ENCOUNTER — TELEPHONE (OUTPATIENT)
Dept: CARDIOLOGY | Facility: CLINIC | Age: 72
End: 2022-09-09

## 2022-09-09 NOTE — TELEPHONE ENCOUNTER
Patient wife states pharmacy has refilled Mr Silverman's Fludrocortisone 0.1 MG tablets. She is wanting to verify with you prior to picking up the medication if her  still needs to take it or not.

## 2022-10-17 ENCOUNTER — TELEPHONE (OUTPATIENT)
Dept: CARDIOLOGY | Facility: CLINIC | Age: 72
End: 2022-10-17

## 2022-10-17 NOTE — TELEPHONE ENCOUNTER
Agree with holding midodrine for SBP > 120. I am pretty sure his BP started to drop without fludrocortisone so I would like to continue it for now as it is.

## 2022-10-18 NOTE — TELEPHONE ENCOUNTER
Spoke to wife Rosa and gave AL recommendations for midodrine.  She had no further questions.  She verbalizes understanding and agrees with the plan.    Radha Jeffers RN  [facility] Triage  10/17/22 0373

## 2022-10-28 ENCOUNTER — OFFICE VISIT (OUTPATIENT)
Dept: FAMILY MEDICINE CLINIC | Facility: CLINIC | Age: 72
End: 2022-10-28

## 2022-10-28 VITALS
BODY MASS INDEX: 24.36 KG/M2 | HEART RATE: 66 BPM | OXYGEN SATURATION: 100 % | HEIGHT: 67 IN | WEIGHT: 155.2 LBS | SYSTOLIC BLOOD PRESSURE: 160 MMHG | TEMPERATURE: 97.8 F | DIASTOLIC BLOOD PRESSURE: 93 MMHG

## 2022-10-28 DIAGNOSIS — R30.0 DYSURIA: Primary | ICD-10-CM

## 2022-10-28 DIAGNOSIS — R35.0 FREQUENT URINATION: ICD-10-CM

## 2022-10-28 DIAGNOSIS — Z23 NEED FOR VIRAL IMMUNIZATION: ICD-10-CM

## 2022-10-28 LAB
BILIRUB BLD-MCNC: NEGATIVE MG/DL
CLARITY, POC: CLEAR
COLOR UR: ABNORMAL
EXPIRATION DATE: ABNORMAL
GLUCOSE UR STRIP-MCNC: NEGATIVE MG/DL
KETONES UR QL: NEGATIVE
LEUKOCYTE EST, POC: NEGATIVE
Lab: ABNORMAL
NITRITE UR-MCNC: NEGATIVE MG/ML
PH UR: 7 [PH] (ref 5–8)
PROT UR STRIP-MCNC: NEGATIVE MG/DL
RBC # UR STRIP: NEGATIVE /UL
SP GR UR: 1.03 (ref 1–1.03)
UROBILINOGEN UR QL: ABNORMAL

## 2022-10-28 PROCEDURE — 99212 OFFICE O/P EST SF 10 MIN: CPT | Performed by: NURSE PRACTITIONER

## 2022-10-28 PROCEDURE — 81003 URINALYSIS AUTO W/O SCOPE: CPT | Performed by: NURSE PRACTITIONER

## 2022-10-28 PROCEDURE — G0008 ADMIN INFLUENZA VIRUS VAC: HCPCS | Performed by: NURSE PRACTITIONER

## 2022-10-28 PROCEDURE — 90662 IIV NO PRSV INCREASED AG IM: CPT | Performed by: NURSE PRACTITIONER

## 2022-11-29 ENCOUNTER — OFFICE VISIT (OUTPATIENT)
Dept: CARDIOLOGY | Facility: CLINIC | Age: 72
End: 2022-11-29

## 2022-11-29 VITALS
WEIGHT: 155.4 LBS | HEIGHT: 67 IN | HEART RATE: 70 BPM | OXYGEN SATURATION: 98 % | DIASTOLIC BLOOD PRESSURE: 60 MMHG | BODY MASS INDEX: 24.39 KG/M2 | SYSTOLIC BLOOD PRESSURE: 112 MMHG

## 2022-11-29 DIAGNOSIS — I95.1 AUTONOMIC ORTHOSTATIC HYPOTENSION: Primary | ICD-10-CM

## 2022-11-29 DIAGNOSIS — I65.23 CAROTID ARTERY PLAQUE, BILATERAL: ICD-10-CM

## 2022-11-29 PROCEDURE — 99214 OFFICE O/P EST MOD 30 MIN: CPT | Performed by: INTERNAL MEDICINE

## 2022-12-13 ENCOUNTER — TELEPHONE (OUTPATIENT)
Dept: CARDIOLOGY | Facility: CLINIC | Age: 72
End: 2022-12-13

## 2022-12-13 NOTE — TELEPHONE ENCOUNTER
Reviewed recommendations with Rei Bay's spouse.  Shanique verbalized understanding of the recommendations.    Thank you,  Deirdre Silver RN  Triage Nurse [facility]

## 2022-12-13 NOTE — TELEPHONE ENCOUNTER
Shanique, Rei Silverman's spouse, called with medication questions/concerns.    Shanique reports that following readings:    Time BP Meds given   9:00 AM 96/73 Fludrocortisone, Carbidopa-levodopa-entacapone   1:00 /80    5:00 /73 midodrine, carbidopa-levodopa-entacapone   9:00 /82      Shanique stated that the patient went to bed around 6 pm (baseline).  Patient usually sleeps until 5/6am, however last night patient woke up around 9 pm.  Shanique stated patient was having hallucinations, was very anxious and had a difficult time getting back to sleep.  Shanique expressed concern that patient's medications are causing these symptoms and is asking if she needs to administer the midodrine/fludrocortisone at different times than the carbidopa-levodopa-entacapone?    Shanique stated she will also place call to neurology for input as well.    Please let me know how you would like to proceed.    Thank you,  Deirdre Silver, RN  Triage Nurse [facility]

## 2022-12-30 RX ORDER — CLOPIDOGREL BISULFATE 75 MG/1
75 TABLET ORAL DAILY
Qty: 90 TABLET | Refills: 2 | Status: SHIPPED | OUTPATIENT
Start: 2022-12-30

## 2023-01-05 ENCOUNTER — TELEPHONE (OUTPATIENT)
Dept: CARDIOLOGY | Facility: CLINIC | Age: 73
End: 2023-01-05
Payer: MEDICARE

## 2023-01-05 RX ORDER — MIDODRINE HYDROCHLORIDE 10 MG/1
10 TABLET ORAL DAILY
Qty: 30 TABLET | Refills: 11 | Status: SHIPPED | OUTPATIENT
Start: 2023-01-05

## 2023-01-05 RX ORDER — MIDODRINE HYDROCHLORIDE 10 MG/1
10 TABLET ORAL 3 TIMES DAILY PRN
Qty: 90 TABLET | Refills: 1 | Status: SHIPPED | OUTPATIENT
Start: 2023-01-05 | End: 2023-01-05 | Stop reason: SDUPTHER

## 2023-01-05 NOTE — TELEPHONE ENCOUNTER
Mia, a 90 day supply costs patient $200.00.  She would like Rx for a 30 day supply.  Are you OK with changing directions from three daily as needed to one daily doni help with the cost?  / CAREN

## 2023-01-05 NOTE — TELEPHONE ENCOUNTER
Patient's daugher (Rosa) called to ask if you would send in new Rx for Midodrine.  She states patient is only taking the medication once daily rather than three times daily and his BP have been fine.  She would like new Rx sent to [facility] on [address] for 30 day supply with refills.

## 2023-01-27 ENCOUNTER — OFFICE VISIT (OUTPATIENT)
Dept: FAMILY MEDICINE CLINIC | Facility: CLINIC | Age: 73
End: 2023-01-27
Payer: MEDICARE

## 2023-01-27 VITALS
TEMPERATURE: 98 F | BODY MASS INDEX: 24.31 KG/M2 | SYSTOLIC BLOOD PRESSURE: 110 MMHG | DIASTOLIC BLOOD PRESSURE: 62 MMHG | HEIGHT: 67 IN | WEIGHT: 154.9 LBS

## 2023-01-27 DIAGNOSIS — Z51.89 ENCOUNTER FOR WOUND RE-CHECK: Primary | ICD-10-CM

## 2023-01-27 PROBLEM — I95.1 ORTHOSTATIC HYPOTENSION: Status: ACTIVE | Noted: 2022-08-12

## 2023-01-27 PROBLEM — I65.23 OCCLUSION AND STENOSIS OF BILATERAL CAROTID ARTERIES: Status: ACTIVE | Noted: 2022-08-12

## 2023-01-27 PROBLEM — Z91.81 HISTORY OF FALLING: Status: ACTIVE | Noted: 2022-01-01

## 2023-01-27 PROBLEM — R13.10 DYSPHAGIA: Status: ACTIVE | Noted: 2022-08-24

## 2023-01-27 PROCEDURE — 99213 OFFICE O/P EST LOW 20 MIN: CPT | Performed by: FAMILY MEDICINE

## 2023-01-27 RX ORDER — CEPHALEXIN 500 MG/1
500 CAPSULE ORAL 2 TIMES DAILY
COMMUNITY
End: 2023-01-29

## 2023-02-01 DIAGNOSIS — N40.0 BENIGN NON-NODULAR PROSTATIC HYPERPLASIA WITHOUT LOWER URINARY TRACT SYMPTOMS: ICD-10-CM

## 2023-02-01 RX ORDER — FINASTERIDE 5 MG/1
TABLET, FILM COATED ORAL
Qty: 90 TABLET | Refills: 1 | Status: SHIPPED | OUTPATIENT
Start: 2023-02-01

## 2023-02-17 RX ORDER — FLUDROCORTISONE ACETATE 0.1 MG/1
TABLET ORAL
Qty: 90 TABLET | Refills: 2 | Status: SHIPPED | OUTPATIENT
Start: 2023-02-17

## 2023-02-23 NOTE — PROGRESS NOTES
"Chief Complaint  Leg Problem (F/U leg  infection  right. Started last wed night, treated abx )    Subjective        Rei Silverman presents to [facility]  History of Present Illness  Pt had wound on leg. He has some gait abn from [employer]. Was with aid and fell on the steps. Wife was away not exactly sure what happened but pt can have trouble with steps. Scraped his leg.   Went to  because there was redness around the wound and concern for infection.   Pt was given keflex which he has been taking and tolerating.   At home looking better. Washing and dressing.   Almost finished with antibiotic.     Objective   Vital Signs:  /62 (BP Location: Right arm, Patient Position: Sitting, Cuff Size: Adult)   Temp 98 °F (36.7 °C) (Infrared)   Ht 168.9 cm (66.5\")   Wt 70.3 kg (154 lb 14.4 oz)   BMI 24.63 kg/m²   Estimated body mass index is 24.63 kg/m² as calculated from the following:    Height as of this encounter: 168.9 cm (66.5\").    Weight as of this encounter: 70.3 kg (154 lb 14.4 oz).       BMI is within normal parameters. No other follow-up for BMI required.      Physical Exam  Vitals reviewed.   Constitutional:       General: He is not in acute distress.     Appearance: Normal appearance. He is not ill-appearing or toxic-appearing.   Eyes:      General: No scleral icterus.     Conjunctiva/sclera: Conjunctivae normal.   Pulmonary:      Effort: Pulmonary effort is normal. No respiratory distress.   Skin:     Comments: There is a linear lesion. No thick eschar. No erythema. Dry.    Neurological:      Mental Status: He is alert and oriented to person, place, and time.      Comments: He is tired today easy to sleep and easy to wake. He does not answer questions appropriately. He does not make eye contact.   Psychiatric:         Behavior: Behavior normal.        Result Review :                   Assessment and Plan   Diagnoses and all orders for this visit:    1. Encounter for wound " re-check (Primary)             Follow Up   No follow-ups on file.  Patient was given instructions and counseling regarding his condition or for health maintenance advice. Please see specific information pulled into the AVS if appropriate.     The area of the skin on his right shin is healing well. No swelling erythema or warmth. D/c topical antibiotic, finish oral antibiotic, use vaseline or eucerin to prevent dried scabbing. Cleanse like rest of skin. Avoid falls. Pt's wife says works with each aid on this. She was not around when this happened so unclear but working on his gait and stairs.   No other changes today.

## 2023-03-20 ENCOUNTER — NURSE TRIAGE (OUTPATIENT)
Dept: CALL CENTER | Facility: HOSPITAL | Age: 73
End: 2023-03-20
Payer: MEDICARE

## 2023-03-20 NOTE — TELEPHONE ENCOUNTER
"Hub-He has urinated in twice in 48 hours. He does have Alzheimer's. He is not complaining of pain.His urine is strong smelling. He feels like he can't urinate.He keeps asking to be helped up to the Bathroom, and he is not able to urinate.  He needs to be seen in the ER. Unable to connect the office.    .     Reason for Disposition  • [1] Unable to urinate (or only a few drops) > 4 hours AND [2] bladder feels very full (e.g., palpable bladder or strong urge to urinate)    Additional Information  • Negative: Shock suspected (e.g., cold/pale/clammy skin, too weak to stand, low BP, rapid pulse)  • Negative: Sounds like a life-threatening emergency to the triager  • Negative: Followed a female genital area injury (e.g., vagina, vulva)  • Negative: Followed a male genital area injury (e.g., penis, scrotum)  • Negative: Vaginal discharge  • Negative: Pus (white, yellow) or bloody discharge from end of penis  • Negative: [1] Taking antibiotic for urinary tract infection (UTI) AND [2] female  • Negative: [1] Taking antibiotic for urinary tract infection (UTI) AND [2] male  • Negative: [1] Discomfort (pain, burning or stinging) when passing urine AND [2] pregnant  • Negative: [1] Discomfort (pain, burning or stinging) when passing urine AND [2] postpartum (from 0 to 6 weeks after delivery)  • Negative: [1] Discomfort (pain, burning or stinging) when passing urine AND [2] female  • Negative: [1] Discomfort (pain, burning or stinging) when passing urine AND [2] male  • Negative: Pain or itching in the vulvar area  • Negative: Pain in scrotum is main symptom  • Negative: Blood in the urine is main symptom  • Negative: Symptoms arising from use of a urinary catheter (Castillo or Coude)    Answer Assessment - Initial Assessment Questions  1. SYMPTOM: \"What's the main symptom you're concerned about?\" (e.g., frequency, incontinence)      Unable to urinate, He has urinated 2 times in 48 hours.   2. ONSET: \"When did the  *No Answer*  " "start?\"      2 days ago.   3. PAIN: \"Is there any pain?\" If Yes, ask: \"How bad is it?\" (Scale: 1-10; mild, moderate, severe)      Difficult to tell.   4. CAUSE: \"What do you think is causing the symptoms?\"      unknown  5. OTHER SYMPTOMS: \"Do you have any other symptoms?\" (e.g., fever, flank pain, blood in urine, pain with urination)      Foul smell.   6. PREGNANCY: \"Is there any chance you are pregnant?\" \"When was your last menstrual period?\"      no    Protocols used: URINARY SYMPTOMS-ADULT-AH      "

## 2023-04-12 ENCOUNTER — TELEPHONE (OUTPATIENT)
Dept: FAMILY MEDICINE CLINIC | Facility: CLINIC | Age: 73
End: 2023-04-12

## 2023-04-12 NOTE — TELEPHONE ENCOUNTER
“Please be informed that patient has passed. Patient has been marked  in the system. The date of death is: 2023    Caller: Shanique Silverman    Relationship: Emergency Contact    Best call back number: [phone]   Chief Complaint   Patient presents with    Complete Physical     Reviewed record in preparation for visit and have obtained necessary documentation. Identified pt with two pt identifiers(name and ). Health Maintenance Due   Topic    Hepatitis C Screening     Pneumococcal 0-64 years (1 - PCV)    Hepatitis B Vaccine (1 of 3 - Risk 3-dose series)    Colorectal Cancer Screening Combo     Foot Exam Q1     COVID-19 Vaccine (3 - Booster for Moderna series)    MICROALBUMIN Q1     Flu Vaccine (1)    A1C test (Diabetic or Prediabetic)     Lipid Screen          Chief Complaint   Patient presents with    Complete Physical        Wt Readings from Last 3 Encounters:   22 186 lb 6.4 oz (84.6 kg)   22 188 lb (85.3 kg)   21 188 lb 6.4 oz (85.5 kg)     Temp Readings from Last 3 Encounters:   22 98.5 °F (36.9 °C) (Temporal)   22 97.5 °F (36.4 °C) (Temporal)   21 98.1 °F (36.7 °C) (Temporal)     BP Readings from Last 3 Encounters:   22 130/60   22 137/86   21 130/78     Pulse Readings from Last 3 Encounters:   22 71   22 71   21 73           Learning Assessment:  :     Learning Assessment 2014   PRIMARY LEARNER Patient   PRIMARY LANGUAGE ENGLISH   LEARNER PREFERENCE PRIMARY DEMONSTRATION     LISTENING   ANSWERED BY patient   RELATIONSHIP SELF       Depression Screening:  :     3 most recent PHQ Screens 2022   Little interest or pleasure in doing things Not at all   Feeling down, depressed, irritable, or hopeless Not at all   Total Score PHQ 2 0       Fall Risk Assessment:  :     No flowsheet data found. Abuse Screening:  :     Abuse Screening Questionnaire 2022   Do you ever feel afraid of your partner? N   Are you in a relationship with someone who physically or mentally threatens you? N   Is it safe for you to go home?  Y       Coordination of Care Questionnaire:  :     1) Have you been to an emergency room, urgent care clinic since your last visit? no   Hospitalized since your last visit? no             2) Have you seen or consulted any other health care providers outside of [address] [place] [address] since your last visit? no  (Include any pap smears or colon screenings in this section.)    3) Do you have an Advance Directive on file? no    4) Are you interested in receiving information on Advance Directives? NO      Patient is accompanied by self I have received verbal consent from Anali Mistry to discuss any/all medical information while they are present in the room. Reviewed record  In preparation for visit and have obtained necessary documentation. 116

## (undated) DEVICE — LOU LOOP RECORDER PACK: Brand: MEDLINE INDUSTRIES, INC.

## (undated) DEVICE — SKIN AFFIX SURG ADHESIVE 72/CS 0.55ML: Brand: MEDLINE